# Patient Record
Sex: FEMALE | Race: WHITE | Employment: UNEMPLOYED | URBAN - METROPOLITAN AREA
[De-identification: names, ages, dates, MRNs, and addresses within clinical notes are randomized per-mention and may not be internally consistent; named-entity substitution may affect disease eponyms.]

---

## 2017-08-15 ENCOUNTER — OFFICE VISIT (OUTPATIENT)
Dept: FAMILY MEDICINE CLINIC | Facility: CLINIC | Age: 42
End: 2017-08-15

## 2017-08-15 VITALS
SYSTOLIC BLOOD PRESSURE: 138 MMHG | RESPIRATION RATE: 16 BRPM | DIASTOLIC BLOOD PRESSURE: 80 MMHG | OXYGEN SATURATION: 95 % | HEART RATE: 107 BPM | TEMPERATURE: 99 F

## 2017-08-15 DIAGNOSIS — J40 BRONCHITIS: Primary | ICD-10-CM

## 2017-08-15 PROCEDURE — 99202 OFFICE O/P NEW SF 15 MIN: CPT | Performed by: PHYSICIAN ASSISTANT

## 2017-08-15 PROCEDURE — 94640 AIRWAY INHALATION TREATMENT: CPT | Performed by: PHYSICIAN ASSISTANT

## 2017-08-15 RX ORDER — ALBUTEROL SULFATE 90 UG/1
2 AEROSOL, METERED RESPIRATORY (INHALATION) EVERY 4 HOURS PRN
Qty: 1 INHALER | Refills: 0 | Status: SHIPPED | OUTPATIENT
Start: 2017-08-15 | End: 2018-01-23 | Stop reason: ALTCHOICE

## 2017-08-15 RX ORDER — AZITHROMYCIN 250 MG/1
TABLET, FILM COATED ORAL
Qty: 6 TABLET | Refills: 0 | Status: SHIPPED | OUTPATIENT
Start: 2017-08-15 | End: 2017-09-05 | Stop reason: ALTCHOICE

## 2017-08-15 RX ORDER — PREDNISONE 20 MG/1
TABLET ORAL
Qty: 10 TABLET | Refills: 0 | Status: SHIPPED | OUTPATIENT
Start: 2017-08-15 | End: 2017-09-05 | Stop reason: ALTCHOICE

## 2017-08-15 RX ORDER — ALBUTEROL SULFATE 2.5 MG/3ML
2.5 SOLUTION RESPIRATORY (INHALATION) ONCE
Status: COMPLETED | OUTPATIENT
Start: 2017-08-15 | End: 2017-08-15

## 2017-08-15 RX ADMIN — ALBUTEROL SULFATE 2.5 MG: 2.5 SOLUTION RESPIRATORY (INHALATION) at 18:35:00

## 2017-08-15 NOTE — PATIENT INSTRUCTIONS
What is Acute Bronchitis? Acute or short-term bronchitis last for days or weeks. It occurs when the bronchial tubes (airways in the lungs) are irritated by a virus, bacteria, or allergen. This causes a cough that produces yellow or greenish mucus.   In secondhand smoke. · Use a humidifier, or breathe in steam from a hot shower. This may help loosen mucus. · Drink a lot of water and juice. They can soothe the throat and may help thin mucus.   · Sit up or use extra pillows when in bed to help lessen cough tubes (airways in the lungs) are irritated by a virus, bacteria, or allergen. This causes a cough that produces yellow or greenish mucus. Inside healthy lungs  Air travels in and out of the lungs through the airways.  The linings of these airways produce s water and juice. They can soothe the throat and may help thin mucus. · Sit up or use extra pillows when in bed to help lessen coughing and congestion. · Ask your provider about using cough medicine, pain and fever medication, or a decongestant.   Antibiot

## 2017-08-15 NOTE — PROGRESS NOTES
CHIEF COMPLAINT:   Patient presents with:  URI        HPI:   Sofia Teresa is a 38year old female who presents for URI symptoms with cough, nasal congestion, sore throat for  4  days.   Feels like symptoms moved more into her chest today and doing so EXTREMITIES:  No clubbing, cyanosis, or edema.     ASSESSMENT AND PLAN:   Netta Braden is a 38year old female who presents with:     ASSESSMENT:  Bronchitis  (primary encounter diagnosis)    PLAN:  Given a albuterol neb in office which small subje

## 2017-09-05 ENCOUNTER — OFFICE VISIT (OUTPATIENT)
Dept: FAMILY MEDICINE CLINIC | Facility: CLINIC | Age: 42
End: 2017-09-05

## 2017-09-05 VITALS
HEART RATE: 100 BPM | TEMPERATURE: 98 F | SYSTOLIC BLOOD PRESSURE: 138 MMHG | OXYGEN SATURATION: 97 % | RESPIRATION RATE: 16 BRPM | DIASTOLIC BLOOD PRESSURE: 80 MMHG

## 2017-09-05 DIAGNOSIS — Z23 NEED FOR PROPHYLACTIC VACCINATION AND INOCULATION AGAINST INFLUENZA: ICD-10-CM

## 2017-09-05 DIAGNOSIS — H92.02 OTALGIA OF LEFT EAR: ICD-10-CM

## 2017-09-05 DIAGNOSIS — J06.9 URI, ACUTE: Primary | ICD-10-CM

## 2017-09-05 PROCEDURE — 99213 OFFICE O/P EST LOW 20 MIN: CPT | Performed by: PHYSICIAN ASSISTANT

## 2017-09-05 PROCEDURE — 90471 IMMUNIZATION ADMIN: CPT | Performed by: PHYSICIAN ASSISTANT

## 2017-09-05 PROCEDURE — 90686 IIV4 VACC NO PRSV 0.5 ML IM: CPT | Performed by: PHYSICIAN ASSISTANT

## 2017-09-05 RX ORDER — FLUTICASONE PROPIONATE 50 MCG
2 SPRAY, SUSPENSION (ML) NASAL DAILY
Qty: 1 BOTTLE | Refills: 0 | Status: SHIPPED | OUTPATIENT
Start: 2017-09-05 | End: 2018-01-23 | Stop reason: ALTCHOICE

## 2017-09-05 RX ORDER — BENZONATATE 200 MG/1
200 CAPSULE ORAL 3 TIMES DAILY PRN
Qty: 3 CAPSULE | Refills: 0 | Status: SHIPPED | OUTPATIENT
Start: 2017-09-05 | End: 2018-01-23 | Stop reason: ALTCHOICE

## 2017-09-05 RX ORDER — AMOXICILLIN AND CLAVULANATE POTASSIUM 875; 125 MG/1; MG/1
1 TABLET, FILM COATED ORAL 2 TIMES DAILY
Qty: 20 TABLET | Refills: 0 | Status: SHIPPED | OUTPATIENT
Start: 2017-09-05 | End: 2017-09-15

## 2017-09-05 NOTE — PROGRESS NOTES
CHIEF COMPLAINT:   Patient presents with:  URI: 5 days. cough, congestion, left ear fullness, right eye drainage. Imm/Inj: request flu shot      HPI:      Ashley Teague is a 38year old female who presents for upper respiratory symptoms for 5 days. GENERAL: WNWD NAD she looks well. SKIN: no rashes,no suspicious lesions  HEAD: atraumatic, normocephalic. No gross tenderness on palpation of  sinuses  EYES: sclera non icteric, conjunctiva clear  EARS: TM's clear with intact landmarks on the right.   Bowen Anderson indicates understanding of these issues and agrees to the plan. The patient is asked to follow up with pcp if sx's persist or worsen.

## 2017-09-05 NOTE — PATIENT INSTRUCTIONS
Viral Respiratory Illness [Adult]  You have an Upper Respiratory Illness (URI) caused by a virus. This illness is contagious during the first few days.  It is spread through the air by coughing and sneezing or by direct contact (touching the sick person a © 0916-1942 [address], [address] [place]. All rights reserved. This information is not intended as a substitute for professional medical care. Always follow your healthcare professional's instructions.

## 2017-09-06 ENCOUNTER — TELEPHONE (OUTPATIENT)
Dept: FAMILY MEDICINE CLINIC | Facility: CLINIC | Age: 42
End: 2017-09-06

## 2017-09-06 RX ORDER — BENZONATATE 200 MG/1
200 CAPSULE ORAL 3 TIMES DAILY PRN
Qty: 30 CAPSULE | Refills: 0 | Status: SHIPPED | OUTPATIENT
Start: 2017-09-06 | End: 2018-01-23 | Stop reason: ALTCHOICE

## 2018-01-23 ENCOUNTER — HOSPITAL ENCOUNTER (OUTPATIENT)
Age: 43
Discharge: HOME | End: 2018-01-23
Attending: FAMILY MEDICINE
Payer: COMMERCIAL

## 2018-01-23 VITALS
OXYGEN SATURATION: 99 % | WEIGHT: 215 LBS | BODY MASS INDEX: 33.74 KG/M2 | RESPIRATION RATE: 16 BRPM | HEIGHT: 67 IN | DIASTOLIC BLOOD PRESSURE: 72 MMHG | SYSTOLIC BLOOD PRESSURE: 143 MMHG | TEMPERATURE: 97 F | HEART RATE: 89 BPM

## 2018-01-23 DIAGNOSIS — J11.1 INFLUENZA: Primary | ICD-10-CM

## 2018-01-23 PROCEDURE — 99213 OFFICE O/P EST LOW 20 MIN: CPT

## 2018-01-23 PROCEDURE — 99204 OFFICE O/P NEW MOD 45 MIN: CPT

## 2018-01-23 RX ORDER — OSELTAMIVIR PHOSPHATE 75 MG/1
75 CAPSULE ORAL 2 TIMES DAILY
Qty: 10 CAPSULE | Refills: 0 | Status: SHIPPED | OUTPATIENT
Start: 2018-01-23 | End: 2018-01-28

## 2018-01-23 NOTE — ED INITIAL ASSESSMENT (HPI)
Pt c/o headache and cough that started Sunday. Yesterday patient statrted having increased achiness and pains. Also had stuffiness and coughing .   Pt had been routinely taking advil and tylenol  Since Sunday - Did have a temp of 100.8 - even with taking

## 2018-01-23 NOTE — ED PROVIDER NOTES
Patient Seen in: [facility]    History   Patient presents with:  Flu    Stated Complaint: flu like symptoms x2 days    HPI    47-year-old female presents for flulike symptoms.   Patient states she started with headache and n Conjunctivae and EOM are normal. Pupils are equal, round, and reactive to light. Neck: Normal range of motion. Neck supple. Cardiovascular: Normal rate, regular rhythm, normal heart sounds and intact distal pulses.     Pulmonary/Chest: Effort normal and

## 2018-11-05 ENCOUNTER — IMMUNIZATION (OUTPATIENT)
Dept: FAMILY MEDICINE CLINIC | Facility: CLINIC | Age: 43
End: 2018-11-05

## 2018-11-05 DIAGNOSIS — Z23 NEED FOR VACCINATION: ICD-10-CM

## 2018-11-05 PROCEDURE — 90686 IIV4 VACC NO PRSV 0.5 ML IM: CPT | Performed by: NURSE PRACTITIONER

## 2018-11-05 PROCEDURE — 90471 IMMUNIZATION ADMIN: CPT | Performed by: NURSE PRACTITIONER

## 2019-04-14 ENCOUNTER — OFFICE VISIT (OUTPATIENT)
Dept: FAMILY MEDICINE CLINIC | Facility: CLINIC | Age: 44
End: 2019-04-14

## 2019-04-14 VITALS
DIASTOLIC BLOOD PRESSURE: 74 MMHG | HEART RATE: 86 BPM | OXYGEN SATURATION: 99 % | RESPIRATION RATE: 12 BRPM | SYSTOLIC BLOOD PRESSURE: 135 MMHG | TEMPERATURE: 99 F

## 2019-04-14 DIAGNOSIS — R09.89 ABNORMAL LUNG SOUNDS: ICD-10-CM

## 2019-04-14 DIAGNOSIS — J40 BRONCHITIS: Primary | ICD-10-CM

## 2019-04-14 PROCEDURE — 99213 OFFICE O/P EST LOW 20 MIN: CPT | Performed by: PHYSICIAN ASSISTANT

## 2019-04-14 RX ORDER — PREDNISONE 20 MG/1
20 TABLET ORAL 2 TIMES DAILY
Qty: 10 TABLET | Refills: 0 | Status: SHIPPED | OUTPATIENT
Start: 2019-04-14 | End: 2019-04-19

## 2019-04-14 RX ORDER — AZITHROMYCIN 250 MG/1
TABLET, FILM COATED ORAL
Qty: 6 TABLET | Refills: 0 | Status: SHIPPED | OUTPATIENT
Start: 2019-04-14 | End: 2021-04-22

## 2019-04-14 RX ORDER — BENZONATATE 200 MG/1
200 CAPSULE ORAL 3 TIMES DAILY PRN
Qty: 30 CAPSULE | Refills: 0 | Status: SHIPPED | OUTPATIENT
Start: 2019-04-14 | End: 2019-04-24

## 2019-04-14 NOTE — PATIENT INSTRUCTIONS
Rest  Fluids   Prednisone with food.  No ibuprofen or aleve   Cough medication as needed   Please follow up with PCP if no improvement or if symptoms worsen

## 2019-04-14 NOTE — PROGRESS NOTES
CHIEF COMPLAINT:   Patient presents with:  Cough        HPI:   Orlando Quach is a 53year old female who presents for cough for 4 days. Dry cough. No chest pain or SOB. No fever/aches/chills. No head congestion, headaches, sore throat.  No OTC medicat lymphadenopathy. ASSESSMENT AND PLAN:     Chichi Santizo is a 53year old female who presents with:     ASSESSMENT:  Bronchitis  (primary encounter diagnosis)  Abnormal lung sounds    PLAN:  Meds as below.   Comfort Care as listed in Patient Inst

## 2019-09-30 ENCOUNTER — HOSPITAL ENCOUNTER (OUTPATIENT)
Age: 44
Discharge: HOME | End: 2019-09-30
Attending: FAMILY MEDICINE
Payer: COMMERCIAL

## 2019-09-30 VITALS
RESPIRATION RATE: 20 BRPM | TEMPERATURE: 98 F | SYSTOLIC BLOOD PRESSURE: 135 MMHG | DIASTOLIC BLOOD PRESSURE: 72 MMHG | HEART RATE: 88 BPM | OXYGEN SATURATION: 100 %

## 2019-09-30 DIAGNOSIS — L02.416 ABSCESS OF LEFT THIGH: Primary | ICD-10-CM

## 2019-09-30 DIAGNOSIS — B86 SCABIES: ICD-10-CM

## 2019-09-30 PROCEDURE — 99213 OFFICE O/P EST LOW 20 MIN: CPT

## 2019-09-30 PROCEDURE — 99214 OFFICE O/P EST MOD 30 MIN: CPT

## 2019-09-30 RX ORDER — HALOBETASOL PROPIONATE 0.5 MG/G
AEROSOL, FOAM TOPICAL
COMMUNITY
End: 2021-04-22

## 2019-09-30 RX ORDER — SULFAMETHOXAZOLE AND TRIMETHOPRIM 800; 160 MG/1; MG/1
1 TABLET ORAL 2 TIMES DAILY
Qty: 14 TABLET | Refills: 0 | Status: SHIPPED | OUTPATIENT
Start: 2019-09-30 | End: 2019-10-07

## 2019-09-30 RX ORDER — PERMETHRIN 50 MG/G
CREAM TOPICAL
Qty: 1 BOTTLE | Refills: 0 | Status: SHIPPED | OUTPATIENT
Start: 2019-09-30 | End: 2021-04-22

## 2019-09-30 NOTE — ED INITIAL ASSESSMENT (HPI)
Abscess to left upper thigh for one and half weeks, this abscess broke  Yesterday but still has a, \"Lump. \" no fevers, pt also has a rash on both her feet and both ankles.

## 2019-10-01 NOTE — ED PROVIDER NOTES
Patient Seen in: [facility]      History   Patient presents with:  Abscess (integumentary)    Stated Complaint: absess in lady area    HPI    53year old female presents for abscess on the left upper inner thigh.  States she noticed s Skin is warm and dry. Small 1 x 1 cm pustule with induration on the left upper inner thigh noted. No drainage noted. Several erythematous papules noted on bilateral plantar surface and dorsal foot, bilateral ankle and forearm.     Psychiatric: She has

## 2021-04-22 ENCOUNTER — HOSPITAL ENCOUNTER (INPATIENT)
Facility: HOSPITAL | Age: 46
LOS: 3 days | Discharge: HOME | DRG: 177 | End: 2021-04-25
Attending: EMERGENCY MEDICINE | Admitting: HOSPITALIST
Payer: COMMERCIAL

## 2021-04-22 ENCOUNTER — APPOINTMENT (OUTPATIENT)
Dept: GENERAL RADIOLOGY | Facility: HOSPITAL | Age: 46
DRG: 177 | End: 2021-04-22
Attending: EMERGENCY MEDICINE
Payer: COMMERCIAL

## 2021-04-22 DIAGNOSIS — U07.1 PNEUMONIA DUE TO COVID-19 VIRUS: Primary | ICD-10-CM

## 2021-04-22 DIAGNOSIS — D72.819 LEUKOPENIA, UNSPECIFIED TYPE: ICD-10-CM

## 2021-04-22 DIAGNOSIS — J12.82 PNEUMONIA DUE TO COVID-19 VIRUS: Primary | ICD-10-CM

## 2021-04-22 DIAGNOSIS — E87.6 HYPOKALEMIA: ICD-10-CM

## 2021-04-22 DIAGNOSIS — D64.9 ANEMIA, UNSPECIFIED TYPE: ICD-10-CM

## 2021-04-22 DIAGNOSIS — D50.9 MICROCYTIC ANEMIA: ICD-10-CM

## 2021-04-22 PROCEDURE — 30233N1 TRANSFUSION OF NONAUTOLOGOUS RED BLOOD CELLS INTO PERIPHERAL VEIN, PERCUTANEOUS APPROACH: ICD-10-PCS | Performed by: INTERNAL MEDICINE

## 2021-04-22 PROCEDURE — 99223 1ST HOSP IP/OBS HIGH 75: CPT | Performed by: HOSPITALIST

## 2021-04-22 PROCEDURE — 71045 X-RAY EXAM CHEST 1 VIEW: CPT | Performed by: EMERGENCY MEDICINE

## 2021-04-22 RX ORDER — HYDRALAZINE HYDROCHLORIDE 20 MG/ML
10 INJECTION INTRAMUSCULAR; INTRAVENOUS EVERY 6 HOURS PRN
Status: DISCONTINUED | OUTPATIENT
Start: 2021-04-22 | End: 2021-04-25

## 2021-04-22 RX ORDER — POTASSIUM CHLORIDE 20 MEQ/1
40 TABLET, EXTENDED RELEASE ORAL ONCE
Status: COMPLETED | OUTPATIENT
Start: 2021-04-22 | End: 2021-04-22

## 2021-04-22 RX ORDER — SODIUM CHLORIDE 9 MG/ML
INJECTION, SOLUTION INTRAVENOUS ONCE
Status: COMPLETED | OUTPATIENT
Start: 2021-04-22 | End: 2021-04-22

## 2021-04-22 RX ORDER — ASCORBIC ACID 500 MG
1000 TABLET ORAL DAILY
Status: DISCONTINUED | OUTPATIENT
Start: 2021-04-22 | End: 2021-04-23

## 2021-04-22 RX ORDER — SODIUM CHLORIDE 9 MG/ML
INJECTION, SOLUTION INTRAVENOUS CONTINUOUS
Status: CANCELLED | OUTPATIENT
Start: 2021-04-22 | End: 2021-04-22

## 2021-04-22 RX ORDER — BENZONATATE 200 MG/1
200 CAPSULE ORAL 3 TIMES DAILY PRN
Status: DISCONTINUED | OUTPATIENT
Start: 2021-04-22 | End: 2021-04-25

## 2021-04-22 RX ORDER — ACETAMINOPHEN 160 MG
2000 TABLET,DISINTEGRATING ORAL DAILY
Status: DISCONTINUED | OUTPATIENT
Start: 2021-04-22 | End: 2021-04-23

## 2021-04-22 RX ORDER — ONDANSETRON 2 MG/ML
4 INJECTION INTRAMUSCULAR; INTRAVENOUS EVERY 6 HOURS PRN
Status: DISCONTINUED | OUTPATIENT
Start: 2021-04-22 | End: 2021-04-25

## 2021-04-22 RX ORDER — ACETAMINOPHEN 325 MG/1
650 TABLET ORAL EVERY 6 HOURS PRN
Status: DISCONTINUED | OUTPATIENT
Start: 2021-04-22 | End: 2021-04-23

## 2021-04-22 RX ORDER — ZINC SULFATE 50(220)MG
220 CAPSULE ORAL 2 TIMES DAILY
Status: DISCONTINUED | OUTPATIENT
Start: 2021-04-22 | End: 2021-04-23

## 2021-04-22 RX ORDER — GUAIFENESIN 600 MG
600 TABLET, EXTENDED RELEASE 12 HR ORAL 2 TIMES DAILY
Status: DISCONTINUED | OUTPATIENT
Start: 2021-04-22 | End: 2021-04-23

## 2021-04-22 RX ORDER — ALBUTEROL SULFATE 90 UG/1
4 AEROSOL, METERED RESPIRATORY (INHALATION) EVERY 4 HOURS PRN
Status: DISCONTINUED | OUTPATIENT
Start: 2021-04-22 | End: 2021-04-25

## 2021-04-22 NOTE — H&P
[facility] HOSPITAL[facility]  History and Physical     Emily Quick Patient Status:  Emergency    1975 MRN [id number]   Location [facility] Attending Sona Montgomery, [facility] [place] Day # 0 PCP Tiarra Layton MD     Chief Complaint: SOB organomegaly. Neurologic: No focal neurological deficits. Musculoskeletal: Moves all extremities. Extremities: No edema or cyanosis. Integument: No rashes or lesions. Psychiatric: Appropriate mood and affect.       Diagnostic Data:      Labs:  Recent

## 2021-04-22 NOTE — ED PROVIDER NOTES
Patient Seen in: [facility] Emergency Department      History   Patient presents with:  Covid    Stated Complaint: Covid + 4/17    HPI/Subjective:   HPI    47-year-old female presents emergency room with chief complaint of cough and shortness of veronica no rhonchi, no wheezing, no stridor.   ABDOMEN: Soft, nondistended,non tender  EXTREMITIES: No peripheral edema, no calf tenderness  RECTAL EXAM–performed with ER tech Bradford Seals as chaperone–guaiac negative brown stool    ED Course     Labs Reviewed   COMP META tests on the individual orders. CBC WITH DIFFERENTIAL WITH PLATELET    Narrative: The following orders were created for panel order CBC WITH DIFFERENTIAL WITH PLATELET.   Procedure                               Abnormality         Status

## 2021-04-23 PROCEDURE — 99255 IP/OBS CONSLTJ NEW/EST HI 80: CPT | Performed by: INTERNAL MEDICINE

## 2021-04-23 PROCEDURE — 99232 SBSQ HOSP IP/OBS MODERATE 35: CPT | Performed by: INTERNAL MEDICINE

## 2021-04-23 PROCEDURE — XW033E5 INTRODUCTION OF REMDESIVIR ANTI-INFECTIVE INTO PERIPHERAL VEIN, PERCUTANEOUS APPROACH, NEW TECHNOLOGY GROUP 5: ICD-10-PCS | Performed by: INTERNAL MEDICINE

## 2021-04-23 RX ORDER — FERROUS SULFATE 325(65) MG
325 TABLET ORAL
Qty: 90 TABLET | Refills: 0 | Status: SHIPPED | OUTPATIENT
Start: 2021-04-23

## 2021-04-23 RX ORDER — MELATONIN
1000 DAILY
Status: DISCONTINUED | OUTPATIENT
Start: 2021-04-23 | End: 2021-04-25

## 2021-04-23 RX ORDER — CYANOCOBALAMIN 1000 UG/ML
1000 INJECTION INTRAMUSCULAR; SUBCUTANEOUS WEEKLY
Status: DISCONTINUED | OUTPATIENT
Start: 2021-04-23 | End: 2021-04-25

## 2021-04-23 RX ORDER — CODEINE PHOSPHATE AND GUAIFENESIN 10; 100 MG/5ML; MG/5ML
5 SOLUTION ORAL EVERY 4 HOURS PRN
Status: DISCONTINUED | OUTPATIENT
Start: 2021-04-23 | End: 2021-04-25

## 2021-04-23 RX ORDER — ACETAMINOPHEN 500 MG
TABLET ORAL EVERY 6 HOURS PRN
Status: DISCONTINUED | OUTPATIENT
Start: 2021-04-23 | End: 2021-04-25

## 2021-04-23 RX ORDER — ASCORBIC ACID 500 MG
500 TABLET ORAL DAILY
Qty: 30 TABLET | Refills: 0 | Status: SHIPPED | OUTPATIENT
Start: 2021-04-23

## 2021-04-23 RX ORDER — SODIUM CHLORIDE 9 MG/ML
INJECTION, SOLUTION INTRAVENOUS ONCE
Status: COMPLETED | OUTPATIENT
Start: 2021-04-23 | End: 2021-04-23

## 2021-04-23 NOTE — PROGRESS NOTES
Hgb 5.8 after 1 unit PRBC transfused. MD Ojeda notified- see new orders, currently transfusing 2nd bag of PRBC.

## 2021-04-23 NOTE — CONSULTS
INFECTIOUS DISEASE CONSULT NOTE    Emily Israel Patient Status:  Inpatient    1975 MRN [id number]   [facility] 5NW-A Attending Sarah Israel MD   Hosp Day # 1 PCP Shen Bowen MD       Reason for Consultation:    Covid 19 infec positives and negatives in the the HPI. Constitutional: as above  Eyes: Negative for eye drainage and redness.   Ears, nose, mouth, throat: Negative for ear pain, oral ulcers  Respiratory: as above  Cardiovascular: Negative for syncope, lower extremity e Negative   WBCUR 11-20*   RBCUR 0-2   BACUR 2+*   EPIUR Many*        COVID-19 Lab Results    COVID-19  Lab Results   Component Value Date    COVID19 Detected (A) 04/17/2021       Pro-Calcitonin  Recent Labs   Lab 04/22/21  1643   PCT 0.05       Cardiac  Re some studies have shown a tendency to more severe disease in pts with anemia  - follow Hb    3. Leukopenia and thrombocytopenia- these can be seen with covid. Monitor. Heme following    4. Hyperbilirubinemia- mild. Recheck in am. Fractionate bili.  LDH is n

## 2021-04-23 NOTE — CONSULTS
[facility] [place] [facility] Hematology and Oncology Consult Note    Reason for Consult: Anemia  Medical Record Number: [id number]   CSN: [id number]   Referring Physician: No ref.  provider found  PCP: Seema Catalan MD    History of Present Illness: 47F with no significant PMH pres reviewed. No pertinent past medical history. History reviewed. No pertinent surgical history.   Social History    Socioeconomic History      Marital status:       Spouse name: Not on file      Number of children: Not on file      Years of education: x 5  · Parvovirus B19  · Normal LDH  · Daily CBC and Retic     Vitamin B12 Deficiency   · 190 on admission. MMA and anti-IF ab  · B12 weekly x 4 then monthly. · Daily PO B12    Lymphocytopenia and mild thrombocytopenia   · Likely 2/2 COVID.  Possibly rela

## 2021-04-23 NOTE — PROGRESS NOTES
NURSING ADMISSION NOTE      Patient admitted via Cart  Oriented to room 508  Safety precautions initiated. Bed in low position. Call light in reach. Admission navigator completed. ID consulted, no new orders.     Heme consulted- awaiting callback

## 2021-04-23 NOTE — PROGRESS NOTES
[facility]  Progress Note    [address] Patient Status:  Inpatient    1975 MRN [id number]   [facility] 5NW-A Attending Thanh Duenas MD   Hosp Day # 1 PCP Thalia Johnson MD     CC: Cough, shortness of breath    SUBJECTIVE: CO2 25.0 04/23/2021     04/23/2021    CA 7.7 04/23/2021    ALB 3.5 04/23/2021    ALKPHO 84 04/23/2021    BILT 2.2 04/23/2021    TP 7.0 04/23/2021    AST 27 04/23/2021    ALT 25 04/23/2021    DDIMER 0.42 04/23/2021    CRP 1.98 04/23/2021    TROP < mg, Intravenous, Q24H  sodium chloride 0.9% IV bolus 500 mL, 500 mL, Intravenous, PRN  Albuterol Sulfate  (90 Base) MCG/ACT inhaler 4 puff, 4 puff, Inhalation, Q4H PRN  benzonatate (TESSALON) cap 200 mg, 200 mg, Oral, TID PRN  acetaminophen (TYLENOL

## 2021-04-23 NOTE — PROGRESS NOTES
COVID-19 Daily Discharge Readiness-Nursing    O2 Sat at Rest:   97 % on RA  Temperature max from last 24 hrs: Temp (24hrs), Av.3 °F (37.4 °C), Min:97.5 °F (36.4 °C), Max:100.1 °F (37.8 °C)    Inflammatory Markers:   Recent Labs   Lab 21  1643

## 2021-04-23 NOTE — CONSULTS
[facility]                       Gastroenterology [address] [facility] Gastroenterology    Emily Wilks Patient Status:  Inpatient    1975 MRN [id number]   [facility] 5NW-A Attending Sadi Wilks MD   [facility] injection 4 mg, 4 mg, Intravenous, Q6H PRN  [COMPLETED] 0.9% NaCl infusion, , Intravenous, Once  hydrALAzine HCl (APRESOLINE) injection 10 mg, 10 mg, Intravenous, Q6H PRN        Allergies: No Known Allergies    SocHx:        Smoking status: Former oropharynx is clear with moist mucosal membranes    Eyes: Sclerae are anicteric    Neck:  Supple without nuchal rigidity    CV: Regular rate and rhythm, with normal S1 and S2    Resp: Diminished; frequent dry cough     Abdomen: Obese, soft, non-tender, non  Mediastinal contours are   smooth.       Impression   CONCLUSION:  Ill-defined opacities are seen in both lungs, left greater than right.  The pattern is consistent with COVID-19 pneumonia.  Continued clinical correlation recommended.       Dictated by (C

## 2021-04-24 PROCEDURE — 99231 SBSQ HOSP IP/OBS SF/LOW 25: CPT | Performed by: INTERNAL MEDICINE

## 2021-04-24 PROCEDURE — 99232 SBSQ HOSP IP/OBS MODERATE 35: CPT | Performed by: INTERNAL MEDICINE

## 2021-04-24 NOTE — PROGRESS NOTES
[facility]    Progress Note    [address] Patient Status:  Inpatient    1975 MRN [id number]   [facility] 5NW-A Attending Geraldo Reza MD   Hosp Day # 2 PCP Gregory Frias MD   This patient is COVID-19 positive.  For purpo deficiency: Continue oral B12. Neutropenia: Secondary to covid and B12 deficiency. Improving. Thrombocytopenia: Likely secondary to COVID and B12 deficeincy. Stable.     COVID pneumonia: Per primary team.        Vivien Foster  4/24/2021  6:56 A

## 2021-04-24 NOTE — PROGRESS NOTES
[facility]  Progress Note    [address] Patient Status:  Inpatient    1975 MRN [id number]   [facility] 5NW-A Attending Stanley Fleischer, MD   Hosp Day # 2 PCP Roxi Valente MD     CC: Cough, shortness of breath    SUBJECTIVE: 83 04/24/2021    BILT 1.3 04/24/2021    TP 7.2 04/24/2021    AST 20 04/24/2021    ALT 22 04/24/2021    DDIMER 0.32 04/24/2021    CRP 2.86 04/24/2021     COVID-19 Lab Results    COVID-19  Lab Results   Component Value Date    COVID19 Detected (A) 04/17/2021 (ROBITUSSIN AC) 100-10 MG/5ML syrup 5 mL, 5 mL, Oral, Q4H PRN  acetaminophen (TYLENOL EXTRA STRENGTH) tab 500-1,000 mg, 500-1,000 mg, Oral, Q6H PRN  sodium chloride 0.9% IV bolus 500 mL, 500 mL, Intravenous, PRN  Albuterol Sulfate  (90 Base) MCG/ACT with patient and RN        Sarwat Elizabeth MD  4/24/2021

## 2021-04-24 NOTE — COVID NURSING ASSESSMENT
COVID-19 Daily Discharge Readiness-Nursing    O2 Sat at Rest:  96  % on room air  O2 Sat with Exertion:    % on    liters   Temperature max from last 24 hrs: Temp (24hrs), Av.7 °F (37.6 °C), Min:99 °F (37.2 °C), Max:100 °F (37.8 °C)    Inflammatory Mar

## 2021-04-24 NOTE — PROGRESS NOTES
INFECTIOUS DISEASE PROGRESS NOTE    Emily Beckman Patient Status:  Inpatient    1975 MRN [id number]   [facility] 5NW-A Attending Susan Beckman MD   Hosp Day # 2 PCP MD Damir Mcnally  CCP    Subjective: Pt not 04/22/21  1707 04/22/21  1707 04/23/21  0046 04/23/21  0553 04/24/21  0608   RBC 4.09  --   --  4.46 4.52   HGB 6.0*   < > 5.8* 7.1* 7.5*   HCT 24.1*  --   --  28.8* 29.1*   MCV 58.9*  --   --  64.6* 64.4*   MCH 14.7*  --   --  15.9* 16.6*   MCHC 24.9*  -- obtained. COMPARISON:  None. INDICATIONS:  Covid + 4/17  PATIENT STATED HISTORY: (As transcribed by Technologist)  Shortness of breath with history of COVID. FINDINGS:  Lung volumes are satisfactory.   There are ill-defined patchy opacities at the left

## 2021-04-24 NOTE — PLAN OF CARE
COVID-19 Daily Discharge Readiness-Nursing    O2 Sat at Rest: 94 % on room air  O2 Sat with Exertion:    % on    liters   Temperature max from last 24 hrs: Temp (24hrs), Av.4 °F (37.4 °C), Min:98.7 °F (37.1 °C), Max:99.9 °F (37.7 °C)    Inflammatory Ma [x] Primary Care Physician     [] Other Specialty    Watched the home discharge recovery videos related to diagnosis. .. [x] Pneumonia     [] COPD    [] Home Health set up. [x] Care partner identified and updated with the plan of care.     Problem: Pa retention  Outcome: Progressing     Problem: HEMATOLOGIC - ADULT  Goal: Maintains hematologic stability  Description: INTERVENTIONS  - Assess for signs and symptoms of bleeding or hemorrhage  - Monitor labs and vital signs for trends  - Administer supporti Monitor labs and vital signs for trends  - Administer supportive blood products/factors, fluids and medications as ordered and appropriate  - Administer supportive blood products/factors as ordered and appropriate  Outcome: Progressing

## 2021-04-24 NOTE — PROGRESS NOTES
[facility] Note: Antimicrobial Weight Based Dose Adjustment for: cefoxitin (Katia Kuhn)    Rena Contreras is a 48year old patient who has been prescribed ceftriaxone (ROCEPHIN) 1000 mg every 24 hours.     Estimated Creatinine Clearance: 115.4 mL/min (b

## 2021-04-25 VITALS
SYSTOLIC BLOOD PRESSURE: 125 MMHG | DIASTOLIC BLOOD PRESSURE: 65 MMHG | WEIGHT: 242 LBS | BODY MASS INDEX: 37.98 KG/M2 | OXYGEN SATURATION: 94 % | HEIGHT: 67 IN | HEART RATE: 73 BPM | RESPIRATION RATE: 22 BRPM | TEMPERATURE: 99 F

## 2021-04-25 PROCEDURE — 99239 HOSP IP/OBS DSCHRG MGMT >30: CPT | Performed by: INTERNAL MEDICINE

## 2021-04-25 PROCEDURE — 99232 SBSQ HOSP IP/OBS MODERATE 35: CPT | Performed by: INTERNAL MEDICINE

## 2021-04-25 NOTE — PLAN OF CARE
COVID-19 Daily Discharge Readiness-Nursing    O2 Sat at Rest: 95% on room air  O2 Sat with Exertion: 94 % on room air  Temperature max from last 24 hrs: Temp (24hrs), Av.4 °F (36.9 °C), Min:98 °F (36.7 °C), Max:98.8 °F (37.1 °C)    Inflammatory Markers discharge plan: Home     F/U appointment scheduled within 7 days. .. [x]? [facility]     []? Pulmonologist     [x]? Primary Care Physician     []? Other Specialty     Watched the home discharge recovery videos related to diagnosis. Guadalupe Riedel Guadalupe Riedel labs and vital signs for trends  - Administer supportive blood products/factors, fluids and medications as ordered and appropriate  - Administer supportive blood products/factors as ordered and appropriate  Outcome: Progressing  Goal: Free from bleeding in retention  Outcome: Progressing

## 2021-04-25 NOTE — DISCHARGE SUMMARY
[facility]  Discharge Summary    [address] Patient Status:  Inpatient    1975 MRN [id number]   [facility] 5NW-A Attending No att. providers found   Hosp Day # 3 PCP Deo Medina MD     Date of Admission: 2021    Date coli sensitive to all antibiotics, was treated with IV Rocephin in hospital.  D-dimer normal.  Was given subcutaneous Lovenox for DVT prophylaxis while in hospital.    Patient improved clinically and discharged home in stable condition today.   Follow-up wi [facility], [address]    Phone: [phone]   · ascorbic acid 500 MG Tabs  · cyanocobalamin 1000 MCG Tabs  · Ferrous Sulfate 325 (65 Fe) MG Tabs          [place] prescription monitoring program data reviewed in epic before pre

## 2021-04-25 NOTE — PROGRESS NOTES
[facility]  Progress Note    [address]  Patient Status:  Inpatient    1975 MRN [id number]   [facility] 5NW-A Attending Sebastien Sherwood MD   Hosp Day # 3 PCP Pamela Christensen MD     CC: Cough, shortness of breath    SUBJECTIVE: 04/25/2021    CO2 27.0 04/25/2021    GLU 87 04/25/2021    CA 8.1 04/25/2021    ALB 3.3 04/25/2021    ALKPHO 75 04/25/2021    BILT 0.8 04/25/2021    TP 6.8 04/25/2021    AST 16 04/25/2021    ALT 24 04/25/2021    CRP 3.49 04/25/2021     COVID-19 Lab Results Intramuscular, Weekly  Vitamin B-12 (VITAMIN B12) tab 1,000 mcg, 1,000 mcg, Oral, Daily  Pantoprazole Sodium (PROTONIX) 40 mg in Sodium Chloride (PF) 0.9 % 10 mL IV push, 40 mg, Intravenous, Q24H  remdesivir 100 mg in sodium chloride 0.9% 270 mL IVPB, 100 to TCC on discharge?:  Follow-up with regular outpatient primary care physician  Estimated date of discharge: Today  Discharge is dependent on: Clinical progress, cleared by ID  At this point Ms. Rizwana Strange  is expected to be discharge to: [facility]

## 2021-04-25 NOTE — PLAN OF CARE
NURSING DISCHARGE NOTE    Discharged Home via Wheelchair. Accompanied by Support staff  Belongings Taken by patient/family. Patient is stable to discharge home.  Medically cleared by all consults and hospitalist. Provided discharge instructions ab instruct to report SOB or any respiratory difficulty  - Respiratory Therapy support as indicated  - Manage/alleviate anxiety  - Monitor for signs/symptoms of CO2 retention  4/25/2021 1558 by Isabella Felty, RN  Outcome: Completed  4/25/2021 0954 b RN  Outcome: Completed  4/25/2021 0954 by Chang Matos RN  Outcome: Progressing     Problem: RESPIRATORY - ADULT  Goal: Achieves optimal ventilation and oxygenation  Description: INTERVENTIONS:  - Assess for changes in respiratory status  - Ass toothpicks and dental floss  - Use electric shaver for shaving  - Use soft bristle tooth brush  - Limit straining and forceful nose blowing  4/25/2021 1558 by Bridgett Ferrara RN  Outcome: Completed  4/25/2021 0954 by Bridgett Ferrara RN  O

## 2021-04-25 NOTE — PROGRESS NOTES
04/25/21 1100   Mobility   O2 walk?  Yes   SPO2% on Room Air at Rest 95   SPO2% Ambulation on Room Air 94

## 2021-04-25 NOTE — PLAN OF CARE
Problem: Patient/Family Goals  Goal: Patient/Family Long Term Goal  Description: Patient's Long Term Goal: To discharge with adequate resources  Interventions:  - Follow plan of care  - Collaborate with healthcare team  - See additional Care Plan goals f administration  - Ensure safe mobilization of patient  - Hold pressure on venipuncture sites to achieve adequate hemostasis  - Assess for signs and symptoms of internal bleeding  - Monitor lab trends  - Patient is to report abnormal signs of bleeding to st

## 2021-04-25 NOTE — PROGRESS NOTES
[facility]    Progress Note    [address] Patient Status:  Inpatient    1975 MRN [id number]   [facility] 5NW-A Attending Christopher Covington MD   Hosp Day # 3 PCP Robert Esquivel MD   This patient is COVID-19 positive.  For purpo oral B12. Neutropenia: Secondary to covid and B12 deficiency. Stable in a safe range. Thrombocytopenia: Likely secondary to COVID and B12 deficeincy. Stable in a safe range. COVID pneumonia: Per primary team.      Rory Fry M.D.   [facility]

## 2021-04-25 NOTE — COVID NURSING ASSESSMENT
COVID-19 Daily Discharge Readiness-Nursing    O2 Sat at Rest:   94  %     Temperature max from last 24 hrs: Temp (24hrs), Av °F (37.2 °C), Min:98 °F (36.7 °C), Max:99.9 °F (37.7 °C)    Inflammatory Markers:   Recent Labs   Lab 21  0552 21

## 2021-04-26 ENCOUNTER — TELEPHONE (OUTPATIENT)
Dept: HEMATOLOGY/ONCOLOGY | Facility: HOSPITAL | Age: 46
End: 2021-04-26

## 2021-04-26 ENCOUNTER — HOSPITAL ENCOUNTER (EMERGENCY)
Facility: HOSPITAL | Age: 46
Discharge: HOME | End: 2021-04-26
Attending: EMERGENCY MEDICINE
Payer: COMMERCIAL

## 2021-04-26 ENCOUNTER — PATIENT OUTREACH (OUTPATIENT)
Dept: CASE MANAGEMENT | Age: 46
End: 2021-04-26

## 2021-04-26 ENCOUNTER — APPOINTMENT (OUTPATIENT)
Dept: ULTRASOUND IMAGING | Age: 46
End: 2021-04-26
Attending: NURSE PRACTITIONER
Payer: COMMERCIAL

## 2021-04-26 ENCOUNTER — HOSPITAL ENCOUNTER (OUTPATIENT)
Age: 46
Discharge: ANOTHER HOSPITAL | End: 2021-04-26
Attending: EMERGENCY MEDICINE
Payer: COMMERCIAL

## 2021-04-26 VITALS
SYSTOLIC BLOOD PRESSURE: 135 MMHG | HEIGHT: 67 IN | WEIGHT: 230 LBS | BODY MASS INDEX: 36.1 KG/M2 | DIASTOLIC BLOOD PRESSURE: 68 MMHG | HEART RATE: 73 BPM | RESPIRATION RATE: 18 BRPM | OXYGEN SATURATION: 96 % | TEMPERATURE: 99 F

## 2021-04-26 VITALS
HEIGHT: 67 IN | OXYGEN SATURATION: 97 % | RESPIRATION RATE: 16 BRPM | HEART RATE: 79 BPM | DIASTOLIC BLOOD PRESSURE: 72 MMHG | TEMPERATURE: 98 F | BODY MASS INDEX: 36.1 KG/M2 | SYSTOLIC BLOOD PRESSURE: 134 MMHG | WEIGHT: 230 LBS

## 2021-04-26 DIAGNOSIS — I80.8 SUPERFICIAL THROMBOPHLEBITIS OF RIGHT UPPER EXTREMITY: ICD-10-CM

## 2021-04-26 DIAGNOSIS — T80.1XXA IV INFILTRATION, INITIAL ENCOUNTER: Primary | ICD-10-CM

## 2021-04-26 DIAGNOSIS — T80.1XXA INTRAVENOUS INFILTRATION, INITIAL ENCOUNTER: Primary | ICD-10-CM

## 2021-04-26 PROCEDURE — 93971 EXTREMITY STUDY: CPT | Performed by: NURSE PRACTITIONER

## 2021-04-26 PROCEDURE — 99284 EMERGENCY DEPT VISIT MOD MDM: CPT

## 2021-04-26 PROCEDURE — 99214 OFFICE O/P EST MOD 30 MIN: CPT

## 2021-04-26 PROCEDURE — 96374 THER/PROPH/DIAG INJ IV PUSH: CPT

## 2021-04-26 NOTE — ED INITIAL ASSESSMENT (HPI)
Pt was discharged from the hospital last night. Admitted for covid on 4/22. States she was getting an iron infusion yesterday, and states the nurse was having difficulty with the iv. Flushed the site, states site infiltrated.    Pt states she noticed

## 2021-04-26 NOTE — ED PROVIDER NOTES
Patient Seen in: [facility] Emergency Department      History   Patient presents with:  Cellulitis    Stated Complaint: Cellulitis in right arm from infiltrated iron infusion.  +COVID    HPI/Subjective:   HPI    70-year-old female presents from the i 134/72   Pulse 79   Temp 98 °F (36.7 °C)   Resp 16   Ht 170.2 cm (5' 7\")   Wt 104.3 kg   LMP 04/12/2021   SpO2 97%   BMI 36.02 kg/m²         Physical Exam    GENERAL: Patient is awake, alert, well-appearing, in no acute distress.   HEENT:  no scleral icter

## 2021-04-26 NOTE — PROGRESS NOTES
1st attempt to assist patient in sched hosp [id number] to  and pt is at [facility] right now due to arm swelling and they will conctact us back if she needs help sched appts.       Lennox Grow, [address] [facility] [phone]     In 1 w

## 2021-04-26 NOTE — ED PROVIDER NOTES
Patient Seen in: [facility]      History   Patient presents with:  Swelling    Stated Complaint: right arm swollen from iv    HPI/Subjective:    This is a 42-year-old female that was just discharged from the hospital yesterday for Covid pn (37.1 °C)   Temp src Tympanic   SpO2 97 %   O2 Device None (Room air)       Current:/68   Pulse 73   Temp 98.8 °F (37.1 °C) (Tympanic)   Resp 18   Ht 170.2 cm (5' 7\")   Wt 104.3 kg   LMP 04/12/2021   SpO2 96%   BMI 36.02 kg/m²         Physical Exam care provider for wound check in 3 days. She also may need follow-up with plastics. Ultrasound shows superficial thrombus in the cephalic vein. No evidence of DVT. Area of erythema was marked here in the clinic.     Patient was sent to the ED for infusi

## 2021-04-26 NOTE — ED PROVIDER NOTES
I reviewed that chart and discussed the case. I have examined the patient and noted swelling tenderness erythema to right antecubital area and distal arm. Patient has no drainage or bleeding noted. Patient slight pain with flexion. No palpable cords.

## 2021-04-26 NOTE — TELEPHONE ENCOUNTER
----- Message from Hazel Tejada MD sent at 4/26/2021  4:32 AM CDT -----  Hi    Please schedule post-hospital f/u in 4 weeks. Also needs an infusion visit for B12. Thanks!

## 2021-04-27 NOTE — PROGRESS NOTES
2nd attempt to contact patient to [facility] fup-MD office to call patient directly to ok in person or virtual visit      Aimee Ortega, [facility] [phone]     In 1 week      appt on Thurs 5/6 @10:30am

## 2021-04-30 ENCOUNTER — HOSPITAL ENCOUNTER (OUTPATIENT)
Age: 46
Discharge: HOME | End: 2021-04-30
Attending: NURSE PRACTITIONER
Payer: COMMERCIAL

## 2021-04-30 VITALS
WEIGHT: 230 LBS | DIASTOLIC BLOOD PRESSURE: 77 MMHG | BODY MASS INDEX: 36.1 KG/M2 | HEART RATE: 63 BPM | RESPIRATION RATE: 20 BRPM | TEMPERATURE: 98 F | HEIGHT: 67 IN | OXYGEN SATURATION: 99 % | SYSTOLIC BLOOD PRESSURE: 149 MMHG

## 2021-04-30 DIAGNOSIS — T80.1XXD: Primary | ICD-10-CM

## 2021-04-30 DIAGNOSIS — L03.113 CELLULITIS OF RIGHT UPPER EXTREMITY: ICD-10-CM

## 2021-04-30 PROCEDURE — 99213 OFFICE O/P EST LOW 20 MIN: CPT

## 2021-04-30 RX ORDER — CEPHALEXIN 500 MG/1
500 CAPSULE ORAL 4 TIMES DAILY
Qty: 28 CAPSULE | Refills: 0 | Status: SHIPPED | OUTPATIENT
Start: 2021-04-30 | End: 2021-05-11 | Stop reason: ALTCHOICE

## 2021-04-30 NOTE — ED PROVIDER NOTES
Patient Seen in: [facility] [place]      History   Patient presents with: Follow - Up  Arm Pain    Stated Complaint: seen here 4/26 for right arm superficial blood clot     HPI/Subjective:    This is a 20-year-old female below stated medical hist headaches. Hematological: Does not bruise/bleed easily. Positive for stated complaint: seen here 4/26 for right arm superficial blood clot   Other systems are as noted in HPI. Constitutional and vital signs reviewed.       All other systems reviewe also has superficial thrombophlebitis in that arm. Denies any chest pain or shortness of breath. States she is concerned because the area still hurts when she moves her arm touch. Otherwise CMS intact right upper extremity.     Area of erythema was [facility] Video

## 2021-04-30 NOTE — ED INITIAL ASSESSMENT (HPI)
Patient presents to IC for follow up of right arm thrombophlebitis from 4/26/21.(iv infiltrated in hospital(4/25/21)Painful with bending or straightening.+cms. Right hand dominent. Recent +covid

## 2021-05-08 NOTE — PROGRESS NOTES
[facility] Hematology and Oncology Clinic Note    Diagnosis:   1. Iron deficiency Anemia 2/2 NSAIDs and suspected GI blood loss  2. B12 deficiency     Treatment History:   1. IM B12 q month    2.  IV Iron  -IV Venofer 200 mg x 3 (04/26/21)    Visit Diagnosis: surgery follow up. EGD/Colon scheduled for 6/16/21. She states that her energy levels are still low. She has not fully recovered. She is taking PO iron + Vit C at home with minor GI complaints. She is on PO B12 at home as well.  RUE swelling has improve Tobacco Use      Smoking status: Former Smoker        Quit date: 8/15/2003        Years since quittin.7      Smokeless tobacco: Never Used     History reviewed. No pertinent family history.     Physical Exam  Height: 170.2 cm (5' 7.01\") ( 1353)    Vitamin B12 Deficiency   · 190 on admission. Negative Intrinsic factor   · IM B12 today   · Daily PO B12     Lymphocytopenia and mild thrombocytopeniaL Resolved   · Likely 2/2 COVID.  Possibly related to severe SANDRA  · B12 as above  · Normal Folic acid

## 2021-05-11 ENCOUNTER — OFFICE VISIT (OUTPATIENT)
Dept: HEMATOLOGY/ONCOLOGY | Facility: HOSPITAL | Age: 46
End: 2021-05-11
Attending: INTERNAL MEDICINE
Payer: COMMERCIAL

## 2021-05-11 ENCOUNTER — TELEPHONE (OUTPATIENT)
Dept: HEMATOLOGY/ONCOLOGY | Facility: HOSPITAL | Age: 46
End: 2021-05-11

## 2021-05-11 VITALS
HEART RATE: 71 BPM | DIASTOLIC BLOOD PRESSURE: 89 MMHG | HEIGHT: 67.01 IN | SYSTOLIC BLOOD PRESSURE: 155 MMHG | WEIGHT: 235 LBS | RESPIRATION RATE: 16 BRPM | OXYGEN SATURATION: 98 % | TEMPERATURE: 97 F | BODY MASS INDEX: 36.88 KG/M2

## 2021-05-11 DIAGNOSIS — E53.8 VITAMIN B12 DEFICIENCY: Primary | ICD-10-CM

## 2021-05-11 DIAGNOSIS — D50.0 IRON DEFICIENCY ANEMIA DUE TO CHRONIC BLOOD LOSS: Primary | ICD-10-CM

## 2021-05-11 DIAGNOSIS — E53.8 VITAMIN B12 DEFICIENCY: ICD-10-CM

## 2021-05-11 PROCEDURE — 96372 THER/PROPH/DIAG INJ SC/IM: CPT

## 2021-05-11 PROCEDURE — 99214 OFFICE O/P EST MOD 30 MIN: CPT | Performed by: INTERNAL MEDICINE

## 2021-05-11 RX ORDER — CYANOCOBALAMIN 1000 UG/ML
1000 INJECTION INTRAMUSCULAR; SUBCUTANEOUS ONCE
Status: COMPLETED | OUTPATIENT
Start: 2021-05-11 | End: 2021-05-11

## 2021-05-11 RX ORDER — CYANOCOBALAMIN 1000 UG/ML
1000 INJECTION INTRAMUSCULAR; SUBCUTANEOUS ONCE
Status: CANCELLED | OUTPATIENT
Start: 2021-06-08

## 2021-05-11 RX ORDER — CYANOCOBALAMIN 1000 UG/ML
1000 INJECTION INTRAMUSCULAR; SUBCUTANEOUS ONCE
Status: CANCELLED | OUTPATIENT
Start: 2021-05-11

## 2021-05-11 RX ADMIN — CYANOCOBALAMIN 1000 MCG: 1000 INJECTION INTRAMUSCULAR; SUBCUTANEOUS at 14:28:00

## 2021-05-11 NOTE — PROGRESS NOTES
Education Record    Learner:  Patient    Disease / Tunde January     Barriers / Limitations:  None   Comments:    Method:  Discussion   Comments:    General Topics:  Plan of care reviewed   Comments:    Outcome:  Shows understanding   Comments:    Ria

## 2021-05-11 NOTE — TELEPHONE ENCOUNTER
Spoke with patient. She verbalized understanding. Kelsea Cohen MD  P Edw Radha Hughes Rns  Results reviewed. B12 is now normal. Continue with PO B12 at home. Continue with oral iron. White blood cells and Platelets have normalized.  Her Hb is almost ba

## 2021-05-20 ENCOUNTER — TELEPHONE (OUTPATIENT)
Dept: HEMATOLOGY/ONCOLOGY | Facility: HOSPITAL | Age: 46
End: 2021-05-20

## 2021-05-20 NOTE — TELEPHONE ENCOUNTER
Toxicities: Vitamin B12/Ferrous Sulfate/Ascorbic Acid    Facial Flushing: (Over the last week Emily has been having facial flushing. No rash. No redness anywhere else on her body.  She wants to know if any of the medications Dr Syl Coelho ordered can cause facial

## 2021-05-20 NOTE — TELEPHONE ENCOUNTER
Spoke with patient. She would feel more comfortable holding her oral iron for a few days to see if facial flushing resolves. She will check in on Monday 5/24/21.

## 2021-06-08 ENCOUNTER — APPOINTMENT (OUTPATIENT)
Dept: HEMATOLOGY/ONCOLOGY | Facility: HOSPITAL | Age: 46
End: 2021-06-08
Attending: INTERNAL MEDICINE
Payer: COMMERCIAL

## 2021-06-16 PROCEDURE — 88305 TISSUE EXAM BY PATHOLOGIST: CPT | Performed by: INTERNAL MEDICINE

## 2021-07-06 ENCOUNTER — APPOINTMENT (OUTPATIENT)
Dept: HEMATOLOGY/ONCOLOGY | Facility: HOSPITAL | Age: 46
End: 2021-07-06
Attending: INTERNAL MEDICINE
Payer: COMMERCIAL

## 2021-08-02 NOTE — PROGRESS NOTES
[facility] Hematology and Oncology Clinic Note    Diagnosis:   1. Iron deficiency Anemia 2/2 NSAIDs and suspected GI blood loss  2. B12 deficiency     Treatment History:   1. IM B12 q month    2.  IV Iron  -IV Venofer 200 mg x 3 (04/26/21)    Visit Diagnosis: surgery follow up. EGD/Colon scheduled for 6/16/21. She states that her energy levels are still low. She has not fully recovered. She is taking PO iron + Vit C at home with minor GI complaints. She is on PO B12 at home as well.  RUE swelling has improve History      Marital status:       Spouse name: Not on file      Number of children: Not on file      Years of education: Not on file      Highest education level: Not on file    Tobacco Use      Smoking status: Former Smoker        Packs/day: 0.00 (238 lb) (08/03 1317)  BSA (Calculated - sq m): 2.18 sq meters (08/03 1317)  Pulse: 62 (08/03 1317)  BP: 156/90 (08/03 1317)  Temp: 97.7 °F (36.5 °C) (08/03 1317)  Do Not Use - Resp Rate: --  SpO2: 98 % (08/03 1317)\    General: NAD, AOX3  HEENT: clear op, acid  · Copper levels normal     RUE SVT: Dx 4/26/21. Continue with supportive care. Restart warm compresses and elevation. Do not recommend anticoagulation given significant SANDRA    COVID 19: Dx 04/2021    RTC in 6 months     NAMAN Trinidad

## 2021-08-03 ENCOUNTER — OFFICE VISIT (OUTPATIENT)
Dept: HEMATOLOGY/ONCOLOGY | Facility: HOSPITAL | Age: 46
End: 2021-08-03
Attending: INTERNAL MEDICINE
Payer: COMMERCIAL

## 2021-08-03 VITALS
HEIGHT: 67.01 IN | BODY MASS INDEX: 37.35 KG/M2 | WEIGHT: 238 LBS | HEART RATE: 62 BPM | OXYGEN SATURATION: 98 % | RESPIRATION RATE: 16 BRPM | TEMPERATURE: 98 F | SYSTOLIC BLOOD PRESSURE: 156 MMHG | DIASTOLIC BLOOD PRESSURE: 90 MMHG

## 2021-08-03 DIAGNOSIS — D50.0 IRON DEFICIENCY ANEMIA DUE TO CHRONIC BLOOD LOSS: ICD-10-CM

## 2021-08-03 DIAGNOSIS — E53.8 VITAMIN B12 DEFICIENCY: Primary | ICD-10-CM

## 2021-08-03 LAB
BASOPHILS # BLD AUTO: 0.03 X10(3) UL (ref 0–0.2)
BASOPHILS NFR BLD AUTO: 0.5 %
DEPRECATED HBV CORE AB SER IA-ACNC: 7.5 NG/ML
EOSINOPHIL # BLD AUTO: 0.22 X10(3) UL (ref 0–0.7)
EOSINOPHIL NFR BLD AUTO: 3.3 %
ERYTHROCYTE [DISTWIDTH] IN BLOOD BY AUTOMATED COUNT: 13.2 %
HCT VFR BLD AUTO: 42.5 %
HGB BLD-MCNC: 14 G/DL
IMM GRANULOCYTES # BLD AUTO: 0.01 X10(3) UL (ref 0–1)
IMM GRANULOCYTES NFR BLD: 0.2 %
IRON SATURATION: 14 %
IRON SERPL-MCNC: 60 UG/DL
LYMPHOCYTES # BLD AUTO: 2 X10(3) UL (ref 1–4)
LYMPHOCYTES NFR BLD AUTO: 30.1 %
MCH RBC QN AUTO: 26.8 PG (ref 26–34)
MCHC RBC AUTO-ENTMCNC: 32.9 G/DL (ref 31–37)
MCV RBC AUTO: 81.4 FL
MONOCYTES # BLD AUTO: 0.46 X10(3) UL (ref 0.1–1)
MONOCYTES NFR BLD AUTO: 6.9 %
NEUTROPHILS # BLD AUTO: 3.93 X10 (3) UL (ref 1.5–7.7)
NEUTROPHILS # BLD AUTO: 3.93 X10(3) UL (ref 1.5–7.7)
NEUTROPHILS NFR BLD AUTO: 59 %
PLATELET # BLD AUTO: 232 10(3)UL (ref 150–450)
RBC # BLD AUTO: 5.22 X10(6)UL
TOTAL IRON BINDING CAPACITY: 416 UG/DL (ref 240–450)
TRANSFERRIN SERPL-MCNC: 279 MG/DL (ref 200–360)
VIT B12 SERPL-MCNC: 311 PG/ML (ref 193–986)
WBC # BLD AUTO: 6.7 X10(3) UL (ref 4–11)

## 2021-08-03 PROCEDURE — 99213 OFFICE O/P EST LOW 20 MIN: CPT | Performed by: INTERNAL MEDICINE

## 2021-08-03 PROCEDURE — 96372 THER/PROPH/DIAG INJ SC/IM: CPT

## 2021-08-03 RX ORDER — CYANOCOBALAMIN 1000 UG/ML
1000 INJECTION INTRAMUSCULAR; SUBCUTANEOUS ONCE
Status: COMPLETED | OUTPATIENT
Start: 2021-08-03 | End: 2021-08-03

## 2021-08-03 RX ORDER — CYANOCOBALAMIN 1000 UG/ML
1000 INJECTION INTRAMUSCULAR; SUBCUTANEOUS ONCE
Status: CANCELLED | OUTPATIENT
Start: 2021-08-31

## 2021-08-03 RX ADMIN — CYANOCOBALAMIN 1000 MCG: 1000 INJECTION INTRAMUSCULAR; SUBCUTANEOUS at 14:06:00

## 2021-08-03 NOTE — PROGRESS NOTES
Education Record    Learner:  Patient    Disease / Diagnosis: vitamin b12 deficiency    Barriers / Limitations:  None   Comments:    Method:  Brief focused   Comments:    General Topics:  Side effects and symptom management and Plan of care reviewed   Comm

## 2022-02-01 ENCOUNTER — OFFICE VISIT (OUTPATIENT)
Dept: HEMATOLOGY/ONCOLOGY | Facility: HOSPITAL | Age: 47
End: 2022-02-01
Attending: INTERNAL MEDICINE
Payer: COMMERCIAL

## 2022-02-01 VITALS
DIASTOLIC BLOOD PRESSURE: 96 MMHG | WEIGHT: 244 LBS | HEART RATE: 67 BPM | RESPIRATION RATE: 18 BRPM | BODY MASS INDEX: 38 KG/M2 | OXYGEN SATURATION: 98 % | SYSTOLIC BLOOD PRESSURE: 160 MMHG | TEMPERATURE: 98 F

## 2022-02-01 DIAGNOSIS — E53.8 VITAMIN B12 DEFICIENCY: Primary | ICD-10-CM

## 2022-02-01 DIAGNOSIS — D50.0 IRON DEFICIENCY ANEMIA DUE TO CHRONIC BLOOD LOSS: ICD-10-CM

## 2022-02-01 LAB
BASOPHILS # BLD AUTO: 0.03 X10(3) UL (ref 0–0.2)
BASOPHILS NFR BLD AUTO: 0.5 %
DEPRECATED HBV CORE AB SER IA-ACNC: 7.1 NG/ML
EOSINOPHIL # BLD AUTO: 0.24 X10(3) UL (ref 0–0.7)
EOSINOPHIL NFR BLD AUTO: 3.7 %
ERYTHROCYTE [DISTWIDTH] IN BLOOD BY AUTOMATED COUNT: 13.4 %
HCT VFR BLD AUTO: 42.8 %
HGB BLD-MCNC: 13.9 G/DL
IMM GRANULOCYTES # BLD AUTO: 0.02 X10(3) UL (ref 0–1)
IMM GRANULOCYTES NFR BLD: 0.3 %
IRON SATN MFR SERPL: 13 %
IRON SERPL-MCNC: 51 UG/DL
LYMPHOCYTES # BLD AUTO: 2.32 X10(3) UL (ref 1–4)
LYMPHOCYTES NFR BLD AUTO: 35.5 %
MCH RBC QN AUTO: 26.9 PG (ref 26–34)
MCHC RBC AUTO-ENTMCNC: 32.5 G/DL (ref 31–37)
MCV RBC AUTO: 82.9 FL
MONOCYTES # BLD AUTO: 0.39 X10(3) UL (ref 0.1–1)
MONOCYTES NFR BLD AUTO: 6 %
NEUTROPHILS # BLD AUTO: 3.54 X10 (3) UL (ref 1.5–7.7)
NEUTROPHILS # BLD AUTO: 3.54 X10(3) UL (ref 1.5–7.7)
NEUTROPHILS NFR BLD AUTO: 54 %
PLATELET # BLD AUTO: 183 10(3)UL (ref 150–450)
RBC # BLD AUTO: 5.16 X10(6)UL
TIBC SERPL-MCNC: 398 UG/DL (ref 240–450)
TRANSFERRIN SERPL-MCNC: 267 MG/DL (ref 200–360)
VIT B12 SERPL-MCNC: 249 PG/ML (ref 193–986)
WBC # BLD AUTO: 6.5 X10(3) UL (ref 4–11)

## 2022-02-01 PROCEDURE — 99213 OFFICE O/P EST LOW 20 MIN: CPT | Performed by: INTERNAL MEDICINE

## 2022-02-01 PROCEDURE — 96372 THER/PROPH/DIAG INJ SC/IM: CPT

## 2022-02-01 RX ORDER — CYANOCOBALAMIN 1000 UG/ML
1000 INJECTION INTRAMUSCULAR; SUBCUTANEOUS ONCE
OUTPATIENT
Start: 2022-02-15

## 2022-02-01 RX ORDER — CYANOCOBALAMIN 1000 UG/ML
1000 INJECTION INTRAMUSCULAR; SUBCUTANEOUS ONCE
Status: COMPLETED | OUTPATIENT
Start: 2022-02-01 | End: 2022-02-01

## 2022-02-01 RX ADMIN — CYANOCOBALAMIN 1000 MCG: 1000 INJECTION INTRAMUSCULAR; SUBCUTANEOUS at 13:30:00

## 2022-02-01 NOTE — PROGRESS NOTES
Education Record    Learner:  Patient    Disease / Diagnosis:iron deficiency anemia    Barriers / Limitations:  None   Comments:    Method:  Discussion   Comments:    General Topics:  Plan of care reviewed   Comments:    Outcome:  Shows understanding   Comments:    Patient here for follow-up and possible B12 injection. Denies any signs of active bleeding. Energy levels are stable. Stopped oral iron. Denies any dyspnea or lightheadedness.

## 2022-02-01 NOTE — PROGRESS NOTES
Education Record    Learner:  Patient    Disease / Tre Barber B12 deficiency    Barriers / Limitations:  None   Comments:    Method:  Brief focused   Comments:    General Topics:  Infection, Medication, Pain, Precautions, Procedure, Side effects and symptom management, Plan of care reviewed and Fall risk and prevention   Comments:    Outcome:  Shows understanding   Comments:

## 2023-10-30 ENCOUNTER — OFFICE VISIT (OUTPATIENT)
Dept: FAMILY MEDICINE CLINIC | Facility: CLINIC | Age: 48
End: 2023-10-30

## 2023-10-30 VITALS
OXYGEN SATURATION: 97 % | BODY MASS INDEX: 40.59 KG/M2 | WEIGHT: 258.63 LBS | HEART RATE: 89 BPM | TEMPERATURE: 98 F | DIASTOLIC BLOOD PRESSURE: 100 MMHG | HEIGHT: 67.08 IN | SYSTOLIC BLOOD PRESSURE: 152 MMHG | RESPIRATION RATE: 18 BRPM

## 2023-10-30 DIAGNOSIS — D69.6 THROMBOCYTOPENIA (HCC): ICD-10-CM

## 2023-10-30 DIAGNOSIS — R03.0 ELEVATED BLOOD PRESSURE READING WITHOUT DIAGNOSIS OF HYPERTENSION: ICD-10-CM

## 2023-10-30 DIAGNOSIS — Z13.6 SCREENING FOR CARDIOVASCULAR CONDITION: ICD-10-CM

## 2023-10-30 DIAGNOSIS — Z13.29 SCREENING FOR ENDOCRINE, METABOLIC, AND IMMUNITY DISORDER: ICD-10-CM

## 2023-10-30 DIAGNOSIS — E53.8 VITAMIN B12 DEFICIENCY: ICD-10-CM

## 2023-10-30 DIAGNOSIS — D50.9 IRON DEFICIENCY ANEMIA, UNSPECIFIED IRON DEFICIENCY ANEMIA TYPE: ICD-10-CM

## 2023-10-30 DIAGNOSIS — L98.9 SKIN LESION OF FACE: Primary | ICD-10-CM

## 2023-10-30 DIAGNOSIS — Z13.0 SCREENING FOR ENDOCRINE, METABOLIC, AND IMMUNITY DISORDER: ICD-10-CM

## 2023-10-30 DIAGNOSIS — L92.0 GRANULOMA ANNULARE: ICD-10-CM

## 2023-10-30 DIAGNOSIS — Z13.228 SCREENING FOR ENDOCRINE, METABOLIC, AND IMMUNITY DISORDER: ICD-10-CM

## 2023-10-30 DIAGNOSIS — Z23 NEED FOR VACCINATION: ICD-10-CM

## 2023-10-30 PROBLEM — D72.819 LEUKOPENIA, UNSPECIFIED TYPE: Status: RESOLVED | Noted: 2021-04-22 | Resolved: 2023-10-30

## 2023-10-30 PROBLEM — E87.6 HYPOKALEMIA: Status: RESOLVED | Noted: 2021-04-22 | Resolved: 2023-10-30

## 2023-10-30 PROBLEM — D64.9 ANEMIA, UNSPECIFIED TYPE: Status: RESOLVED | Noted: 2021-04-22 | Resolved: 2023-10-30

## 2023-10-30 PROCEDURE — 99203 OFFICE O/P NEW LOW 30 MIN: CPT | Performed by: FAMILY MEDICINE

## 2023-10-30 PROCEDURE — 3080F DIAST BP >= 90 MM HG: CPT | Performed by: FAMILY MEDICINE

## 2023-10-30 PROCEDURE — 90686 IIV4 VACC NO PRSV 0.5 ML IM: CPT | Performed by: FAMILY MEDICINE

## 2023-10-30 PROCEDURE — 90471 IMMUNIZATION ADMIN: CPT | Performed by: FAMILY MEDICINE

## 2023-10-30 PROCEDURE — 3008F BODY MASS INDEX DOCD: CPT | Performed by: FAMILY MEDICINE

## 2023-10-30 PROCEDURE — 3077F SYST BP >= 140 MM HG: CPT | Performed by: FAMILY MEDICINE

## 2023-11-06 ENCOUNTER — LAB ENCOUNTER (OUTPATIENT)
Dept: LAB | Age: 48
End: 2023-11-06
Attending: FAMILY MEDICINE
Payer: COMMERCIAL

## 2023-11-06 ENCOUNTER — NURSE ONLY (OUTPATIENT)
Dept: FAMILY MEDICINE CLINIC | Facility: CLINIC | Age: 48
End: 2023-11-06
Payer: COMMERCIAL

## 2023-11-06 DIAGNOSIS — Z13.6 SCREENING FOR CARDIOVASCULAR CONDITION: ICD-10-CM

## 2023-11-06 DIAGNOSIS — Z13.228 SCREENING FOR ENDOCRINE, METABOLIC, AND IMMUNITY DISORDER: ICD-10-CM

## 2023-11-06 DIAGNOSIS — D50.9 IRON DEFICIENCY ANEMIA, UNSPECIFIED IRON DEFICIENCY ANEMIA TYPE: ICD-10-CM

## 2023-11-06 DIAGNOSIS — Z13.0 SCREENING FOR ENDOCRINE, METABOLIC, AND IMMUNITY DISORDER: ICD-10-CM

## 2023-11-06 DIAGNOSIS — E53.8 VITAMIN B12 DEFICIENCY: ICD-10-CM

## 2023-11-06 DIAGNOSIS — Z13.29 SCREENING FOR ENDOCRINE, METABOLIC, AND IMMUNITY DISORDER: ICD-10-CM

## 2023-11-06 LAB
ALBUMIN SERPL-MCNC: 3.9 G/DL (ref 3.4–5)
ALBUMIN/GLOB SERPL: 1.1 {RATIO} (ref 1–2)
ALP LIVER SERPL-CCNC: 78 U/L
ALT SERPL-CCNC: 53 U/L
ANION GAP SERPL CALC-SCNC: 4 MMOL/L (ref 0–18)
AST SERPL-CCNC: 25 U/L (ref 15–37)
BASOPHILS # BLD AUTO: 0.03 X10(3) UL (ref 0–0.2)
BASOPHILS NFR BLD AUTO: 0.5 %
BILIRUB SERPL-MCNC: 1.6 MG/DL (ref 0.1–2)
BUN BLD-MCNC: 13 MG/DL (ref 9–23)
CALCIUM BLD-MCNC: 9 MG/DL (ref 8.5–10.1)
CHLORIDE SERPL-SCNC: 107 MMOL/L (ref 98–112)
CHOLEST SERPL-MCNC: 193 MG/DL (ref ?–200)
CO2 SERPL-SCNC: 27 MMOL/L (ref 21–32)
CREAT BLD-MCNC: 1.02 MG/DL
DEPRECATED HBV CORE AB SER IA-ACNC: 11.3 NG/ML
EGFRCR SERPLBLD CKD-EPI 2021: 67 ML/MIN/1.73M2 (ref 60–?)
EOSINOPHIL # BLD AUTO: 0.25 X10(3) UL (ref 0–0.7)
EOSINOPHIL NFR BLD AUTO: 4.5 %
ERYTHROCYTE [DISTWIDTH] IN BLOOD BY AUTOMATED COUNT: 13.3 %
FASTING PATIENT LIPID ANSWER: YES
FASTING STATUS PATIENT QL REPORTED: YES
GLOBULIN PLAS-MCNC: 3.7 G/DL (ref 2.8–4.4)
GLUCOSE BLD-MCNC: 123 MG/DL (ref 70–99)
HCT VFR BLD AUTO: 43.3 %
HDLC SERPL-MCNC: 45 MG/DL (ref 40–59)
HGB BLD-MCNC: 14.6 G/DL
IMM GRANULOCYTES # BLD AUTO: 0.02 X10(3) UL (ref 0–1)
IMM GRANULOCYTES NFR BLD: 0.4 %
IRON SATN MFR SERPL: 17 %
IRON SERPL-MCNC: 69 UG/DL
LDLC SERPL CALC-MCNC: 133 MG/DL (ref ?–100)
LYMPHOCYTES # BLD AUTO: 2.17 X10(3) UL (ref 1–4)
LYMPHOCYTES NFR BLD AUTO: 39.1 %
MCH RBC QN AUTO: 28.1 PG (ref 26–34)
MCHC RBC AUTO-ENTMCNC: 33.7 G/DL (ref 31–37)
MCV RBC AUTO: 83.3 FL
MONOCYTES # BLD AUTO: 0.33 X10(3) UL (ref 0.1–1)
MONOCYTES NFR BLD AUTO: 5.9 %
NEUTROPHILS # BLD AUTO: 2.75 X10 (3) UL (ref 1.5–7.7)
NEUTROPHILS # BLD AUTO: 2.75 X10(3) UL (ref 1.5–7.7)
NEUTROPHILS NFR BLD AUTO: 49.6 %
NONHDLC SERPL-MCNC: 148 MG/DL (ref ?–130)
OSMOLALITY SERPL CALC.SUM OF ELEC: 287 MOSM/KG (ref 275–295)
PLATELET # BLD AUTO: 191 10(3)UL (ref 150–450)
POTASSIUM SERPL-SCNC: 4 MMOL/L (ref 3.5–5.1)
PROT SERPL-MCNC: 7.6 G/DL (ref 6.4–8.2)
RBC # BLD AUTO: 5.2 X10(6)UL
SODIUM SERPL-SCNC: 138 MMOL/L (ref 136–145)
T4 FREE SERPL-MCNC: 0.8 NG/DL (ref 0.8–1.7)
TIBC SERPL-MCNC: 408 UG/DL (ref 240–450)
TRANSFERRIN SERPL-MCNC: 274 MG/DL (ref 200–360)
TRIGL SERPL-MCNC: 84 MG/DL (ref 30–149)
TSI SER-ACNC: 7.51 MIU/ML (ref 0.36–3.74)
VIT B12 SERPL-MCNC: 342 PG/ML (ref 193–986)
VLDLC SERPL CALC-MCNC: 15 MG/DL (ref 0–30)
WBC # BLD AUTO: 5.6 X10(3) UL (ref 4–11)

## 2023-11-06 PROCEDURE — 82728 ASSAY OF FERRITIN: CPT | Performed by: FAMILY MEDICINE

## 2023-11-06 PROCEDURE — 83036 HEMOGLOBIN GLYCOSYLATED A1C: CPT | Performed by: FAMILY MEDICINE

## 2023-11-06 PROCEDURE — 80053 COMPREHEN METABOLIC PANEL: CPT | Performed by: FAMILY MEDICINE

## 2023-11-06 PROCEDURE — 84443 ASSAY THYROID STIM HORMONE: CPT | Performed by: FAMILY MEDICINE

## 2023-11-06 PROCEDURE — 84439 ASSAY OF FREE THYROXINE: CPT | Performed by: FAMILY MEDICINE

## 2023-11-06 PROCEDURE — 83540 ASSAY OF IRON: CPT | Performed by: FAMILY MEDICINE

## 2023-11-06 PROCEDURE — 85025 COMPLETE CBC W/AUTO DIFF WBC: CPT | Performed by: FAMILY MEDICINE

## 2023-11-06 PROCEDURE — 83550 IRON BINDING TEST: CPT | Performed by: FAMILY MEDICINE

## 2023-11-06 PROCEDURE — 36415 COLL VENOUS BLD VENIPUNCTURE: CPT

## 2023-11-06 PROCEDURE — 80061 LIPID PANEL: CPT | Performed by: FAMILY MEDICINE

## 2023-11-06 PROCEDURE — 82607 VITAMIN B-12: CPT

## 2023-11-07 DIAGNOSIS — E78.2 MIXED HYPERLIPIDEMIA: ICD-10-CM

## 2023-11-07 DIAGNOSIS — R73.03 PREDIABETES: ICD-10-CM

## 2023-11-07 DIAGNOSIS — R73.01 ELEVATED FASTING GLUCOSE: Primary | ICD-10-CM

## 2023-11-07 DIAGNOSIS — R79.89 ABNORMAL TSH: ICD-10-CM

## 2023-11-07 LAB
EST. AVERAGE GLUCOSE BLD GHB EST-MCNC: 120 MG/DL (ref 68–126)
HBA1C MFR BLD: 5.8 % (ref ?–5.7)

## 2023-11-21 ENCOUNTER — LAB REQUISITION (OUTPATIENT)
Dept: LAB | Facility: HOSPITAL | Age: 48
End: 2023-11-21
Payer: COMMERCIAL

## 2023-11-21 DIAGNOSIS — D48.5 NEOPLASM OF UNCERTAIN BEHAVIOR OF SKIN: ICD-10-CM

## 2023-11-21 PROCEDURE — 88305 TISSUE EXAM BY PATHOLOGIST: CPT | Performed by: PHYSICIAN ASSISTANT

## 2023-12-04 ENCOUNTER — OFFICE VISIT (OUTPATIENT)
Dept: FAMILY MEDICINE CLINIC | Facility: CLINIC | Age: 48
End: 2023-12-04
Payer: COMMERCIAL

## 2023-12-04 VITALS
DIASTOLIC BLOOD PRESSURE: 90 MMHG | SYSTOLIC BLOOD PRESSURE: 140 MMHG | HEIGHT: 67.95 IN | HEART RATE: 77 BPM | TEMPERATURE: 98 F | WEIGHT: 259.81 LBS | BODY MASS INDEX: 39.38 KG/M2 | RESPIRATION RATE: 18 BRPM | OXYGEN SATURATION: 96 %

## 2023-12-04 DIAGNOSIS — R73.03 PREDIABETES: ICD-10-CM

## 2023-12-04 DIAGNOSIS — Z12.4 SCREENING FOR CERVICAL CANCER: ICD-10-CM

## 2023-12-04 DIAGNOSIS — I10 BENIGN ESSENTIAL HYPERTENSION: ICD-10-CM

## 2023-12-04 DIAGNOSIS — Z23 NEED FOR VACCINATION: ICD-10-CM

## 2023-12-04 DIAGNOSIS — Z12.31 ENCOUNTER FOR SCREENING MAMMOGRAM FOR MALIGNANT NEOPLASM OF BREAST: ICD-10-CM

## 2023-12-04 DIAGNOSIS — E78.2 MIXED HYPERLIPIDEMIA: ICD-10-CM

## 2023-12-04 DIAGNOSIS — Z00.00 ANNUAL PHYSICAL EXAM: Primary | ICD-10-CM

## 2023-12-04 PROCEDURE — 87624 HPV HI-RISK TYP POOLED RSLT: CPT | Performed by: FAMILY MEDICINE

## 2023-12-04 PROCEDURE — 88175 CYTOPATH C/V AUTO FLUID REDO: CPT | Performed by: FAMILY MEDICINE

## 2023-12-04 RX ORDER — DESONIDE 0.5 MG/G
CREAM TOPICAL
COMMUNITY
Start: 2023-12-01

## 2023-12-04 RX ORDER — LISINOPRIL 10 MG/1
10 TABLET ORAL DAILY
Qty: 90 TABLET | Refills: 1 | Status: SHIPPED | OUTPATIENT
Start: 2023-12-04

## 2023-12-05 LAB — HPV I/H RISK 1 DNA SPEC QL NAA+PROBE: NEGATIVE

## 2023-12-08 LAB
.: NORMAL
.: NORMAL

## 2023-12-28 ENCOUNTER — TELEPHONE (OUTPATIENT)
Dept: FAMILY MEDICINE CLINIC | Facility: CLINIC | Age: 48
End: 2023-12-28

## 2023-12-28 DIAGNOSIS — I10 BENIGN ESSENTIAL HYPERTENSION: ICD-10-CM

## 2023-12-28 RX ORDER — LISINOPRIL 20 MG/1
20 TABLET ORAL DAILY
Qty: 30 TABLET | Refills: 1 | Status: SHIPPED | OUTPATIENT
Start: 2023-12-28

## 2023-12-28 NOTE — TELEPHONE ENCOUNTER
Called pt, reports her BP has been running high 130s to 140s over high 80s. Denies s/e of medication, headaches, or dizziness. Pt wants to know if PCP will make changes to medication. Routed to provider for review and advice.

## 2023-12-28 NOTE — TELEPHONE ENCOUNTER
Pt called office stating her that since she started her medication her BP has been high. The lowest top number is 130's and into the 140's. The bottom lowest has bee 85's and highest has been 87/90.

## 2023-12-29 NOTE — TELEPHONE ENCOUNTER
Please call pt to inform:    - INCREASE Lisinopril to 20 mg (from 10 mg) daily. She can finish what she has at home (take 10 mg tab x 2) OR she can pickup the new Rx I sent in of the 20 mg tab. - keep BP log over the next 2 wks then please call in or send [facility] message with BP readings. Thanks.

## 2024-01-19 ENCOUNTER — HOSPITAL ENCOUNTER (OUTPATIENT)
Dept: MAMMOGRAPHY | Age: 49
Discharge: HOME | End: 2024-01-19
Attending: FAMILY MEDICINE
Payer: COMMERCIAL

## 2024-01-19 DIAGNOSIS — Z12.31 ENCOUNTER FOR SCREENING MAMMOGRAM FOR MALIGNANT NEOPLASM OF BREAST: ICD-10-CM

## 2024-01-19 PROCEDURE — 77067 SCR MAMMO BI INCL CAD: CPT | Performed by: FAMILY MEDICINE

## 2024-01-19 PROCEDURE — 77063 BREAST TOMOSYNTHESIS BI: CPT | Performed by: FAMILY MEDICINE

## 2024-01-23 ENCOUNTER — PATIENT MESSAGE (OUTPATIENT)
Dept: FAMILY MEDICINE CLINIC | Facility: CLINIC | Age: 49
End: 2024-01-23

## 2024-01-23 DIAGNOSIS — I10 BENIGN ESSENTIAL HYPERTENSION: Primary | ICD-10-CM

## 2024-01-24 NOTE — TELEPHONE ENCOUNTER
Benign essential hypertension  - BP elevated today (home BP readings also elevated)  - 11/2023 BMP with normal lytes and SCr  -START Lisinopril 10 mg daily, R/B/SE of new med d/w pt  - d/w pt a healthy, low-sodium diet, regular exercise, and wt loss  - RTC in 2 wks or may send in [facility] with BP readings for BP f-u     - lisinopril 10 MG Oral Tab; Take 1 tablet (10 mg total) by mouth daily.  Dispense: 90 tablet; Refill: 1

## 2024-01-24 NOTE — TELEPHONE ENCOUNTER
From: Emily Pitts  To: Rosemary Bray  Sent: 1/23/2024 7:22 PM CST  Subject: Blood Pressure log since new 20mg dosing    BP  Tues 1/2 6:30pm  141/89  Thur 1/4 7pm  139/90  Fri 1/5 4pm  126/81  Sat 1/6 1pm  127/84  Mon 1/7 10pm  124/82  Tues 1/8 3pm  128/84  Thurs 1/10 7pm  133/87  Tues1/16 8pm  152/90  Wed 1/17 9:30pm  137/86  Thur 1/18 10pm  127/87  Tues 1/23 7pm  139/89

## 2024-01-25 RX ORDER — LISINOPRIL AND HYDROCHLOROTHIAZIDE 20; 12.5 MG/1; MG/1
1 TABLET ORAL DAILY
Qty: 30 TABLET | Refills: 1 | Status: SHIPPED | OUTPATIENT
Start: 2024-01-25

## 2024-03-09 ENCOUNTER — OFFICE VISIT (OUTPATIENT)
Dept: FAMILY MEDICINE CLINIC | Facility: CLINIC | Age: 49
End: 2024-03-09
Payer: COMMERCIAL

## 2024-03-09 VITALS
BODY MASS INDEX: 37.67 KG/M2 | HEIGHT: 67 IN | DIASTOLIC BLOOD PRESSURE: 88 MMHG | TEMPERATURE: 99 F | WEIGHT: 240 LBS | HEART RATE: 76 BPM | OXYGEN SATURATION: 97 % | SYSTOLIC BLOOD PRESSURE: 136 MMHG | RESPIRATION RATE: 16 BRPM

## 2024-03-09 DIAGNOSIS — J98.01 COUGH DUE TO BRONCHOSPASM: Primary | ICD-10-CM

## 2024-03-09 PROCEDURE — 3075F SYST BP GE 130 - 139MM HG: CPT | Performed by: NURSE PRACTITIONER

## 2024-03-09 PROCEDURE — 3079F DIAST BP 80-89 MM HG: CPT | Performed by: NURSE PRACTITIONER

## 2024-03-09 PROCEDURE — 3008F BODY MASS INDEX DOCD: CPT | Performed by: NURSE PRACTITIONER

## 2024-03-09 PROCEDURE — 99213 OFFICE O/P EST LOW 20 MIN: CPT | Performed by: NURSE PRACTITIONER

## 2024-03-09 RX ORDER — BENZONATATE 200 MG/1
200 CAPSULE ORAL 3 TIMES DAILY PRN
Qty: 45 CAPSULE | Refills: 0 | Status: SHIPPED | OUTPATIENT
Start: 2024-03-09

## 2024-03-09 NOTE — PROGRESS NOTES
CHIEF COMPLAINT:     Chief Complaint   Patient presents with    Cough     X 4 days - worsening, occasionally productive, otc delsym, mucinex          HPI:   Emily Pitts is a 48 year old female who presents for cough for  4  days.  Cough started gradually and is described as tight and deep.  Cough was  a little productive at first now it is dry. Other triggers for the cough: talking, deep breaths Home treatments include: delsym, mucinex   Associated symptoms include: headache from coughing fits.        Current Outpatient Medications   Medication Sig Dispense Refill    benzonatate 200 MG Oral Cap Take 1 capsule (200 mg total) by mouth 3 (three) times daily as needed for cough. 45 capsule 0    lisinopril-hydroCHLOROthiazide 20-12.5 MG Oral Tab Take 1 tablet by mouth daily. 30 tablet 1    hydrocortisone 2.5 % External Cream Apply 1 Application topically 2 (two) times daily.      desonide 0.05 % External Cream       Vitamin B-12 1000 MCG Oral Tab Take 1 tablet (1,000 mcg total) by mouth daily. (Patient not taking: Reported on 10/30/2023) 30 tablet 3      Past Medical History:   Diagnosis Date    Anemia     Back pain ?    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    Just dried blood and tinges of blood when I blow my nose,    Body piercing     Decorative tattoo     Fatigue No idea    Hemorrhoids     During pregnancy    Leaking of urine ?    Skin blushing/flushing Recently, past couple weeks    Sleep disturbance No idea      Social History:  Social History     Socioeconomic History    Marital status:    Tobacco Use    Smoking status: Former     Packs/day: 0.00     Years: 0.00     Additional pack years: 0.00     Total pack years: 0.00     Types: Cigarettes     Quit date: 3/31/2004     Years since quittin.9    Smokeless tobacco: Never   Substance and Sexual Activity    Alcohol use: Yes     Comment: Occasional beer    Drug use: Not  Currently     Types: Cannabis     Comment: In high school and college        REVIEW OF SYSTEMS:   GENERAL: No fever or chills.  SKIN: No rashes, or other skin lesions.   EYES: Denies blurred vision or double vision.  HENT: Denies ear pain, decreased hearing, or sore throat.  Reports mild sinus congestion.  CARDIOVASCULAR: Denies chest pain or palpitations  LUNGS: Per HPI. Denies shortness of breath with exertion or rest.   GI: Denies N/V/C/D or abdominal pain.      EXAM:   /88   Pulse 76   Temp 98.9 °F (37.2 °C)   Resp 16   Ht 5' 7\" (1.702 m)   Wt 240 lb (108.9 kg)   SpO2 97%   BMI 37.59 kg/m²   GENERAL: well developed, well nourished,in no apparent distress  SKIN: no rashes, no suspicious lesions  EYES: Conjunctiva clear.  No scleral icterus.  HENT: Atraumatic, normocephalic.  TM's clear bilaterally.  Nostrils patent, nasal mucosa pink and non-inflamed.  No erythema of the throat.  NECK: supple, non-tender.  LUNGS: Normal respiratory rate. Normal effort.  Dry cough. no wheezing. No rales or crackles. No dullness on percussion of anterior and posterior chest wall. No decreased BS. Normal tactile fremitus. Normal egophony.  CARDIO: RRR without murmur  LYMPH: No cervical or supraclavicular lymphadenopathy.   EXTREMITIES:  No clubbing, cyanosis, or edema.    ASSESSMENT AND PLAN:   Emily Pitts is a 48 year old female who presents with:     ASSESSMENT:  Encounter Diagnosis   Name Primary?    Cough due to bronchospasm Yes       PLAN:  Meds as below.  Comfort Care as listed in Patient Instructions.      Meds & Refills for this Visit:  Requested Prescriptions     Signed Prescriptions Disp Refills    benzonatate 200 MG Oral Cap 45 capsule 0     Sig: Take 1 capsule (200 mg total) by mouth 3 (three) times daily as needed for cough.       Side effects, risks, benefits, of medication explained and discussed.    There are no Patient Instructions on file for this visit.    The patient indicates understanding  of these issues and agrees to the plan.  The patient is asked to return if sx's persist or worsen.

## 2024-04-25 DIAGNOSIS — I10 BENIGN ESSENTIAL HYPERTENSION: ICD-10-CM

## 2024-04-25 RX ORDER — LISINOPRIL AND HYDROCHLOROTHIAZIDE 20; 12.5 MG/1; MG/1
1 TABLET ORAL DAILY
Qty: 30 TABLET | Refills: 1 | Status: SHIPPED | OUTPATIENT
Start: 2024-04-25

## 2024-04-25 NOTE — TELEPHONE ENCOUNTER
Refill Passed Protocol:     Pt requesting refill of   Requested Prescriptions     Pending Prescriptions Disp Refills    LISINOPRIL-HYDROCHLOROTHIAZIDE 20-12.5 MG Oral Tab [Pharmacy Med Name: LISINOPRIL-HCTZ 20/12.5MG TABLETS] 30 tablet 1     Sig: TAKE 1 TABLET BY MOUTH DAILY      Refill was approved and sent to pharmacy:     Hypertension Medications Protocol [id number]/25/2024 10:43 AM   Protocol Details CMP or BMP in past 12 months    Last BP reading less than 140/90    In person appointment or virtual visit in the past 12 mos or appointment in next 3 mos    EGFRCR or GFRNAA > 50        Last Time Medication was Filled:  1/25/2024    Last Office Visit with Provider: 12/4/2023    No future appointments.

## 2024-05-21 ENCOUNTER — OFFICE VISIT (OUTPATIENT)
Dept: FAMILY MEDICINE CLINIC | Facility: CLINIC | Age: 49
End: 2024-05-21

## 2024-05-21 VITALS
WEIGHT: 240 LBS | RESPIRATION RATE: 20 BRPM | BODY MASS INDEX: 38 KG/M2 | OXYGEN SATURATION: 98 % | HEART RATE: 75 BPM | SYSTOLIC BLOOD PRESSURE: 116 MMHG | DIASTOLIC BLOOD PRESSURE: 78 MMHG | TEMPERATURE: 98 F

## 2024-05-21 DIAGNOSIS — B02.9 HERPES ZOSTER WITHOUT COMPLICATION: Primary | ICD-10-CM

## 2024-05-21 PROBLEM — J12.82 PNEUMONIA DUE TO COVID-19 VIRUS: Status: RESOLVED | Noted: 2021-04-22 | Resolved: 2024-05-21

## 2024-05-21 PROBLEM — U07.1 PNEUMONIA DUE TO COVID-19 VIRUS: Status: RESOLVED | Noted: 2021-04-22 | Resolved: 2024-05-21

## 2024-05-21 PROCEDURE — 3074F SYST BP LT 130 MM HG: CPT | Performed by: FAMILY MEDICINE

## 2024-05-21 PROCEDURE — 99213 OFFICE O/P EST LOW 20 MIN: CPT | Performed by: FAMILY MEDICINE

## 2024-05-21 PROCEDURE — 3078F DIAST BP <80 MM HG: CPT | Performed by: FAMILY MEDICINE

## 2024-05-21 RX ORDER — VALACYCLOVIR HYDROCHLORIDE 1 G/1
1000 TABLET, FILM COATED ORAL EVERY 8 HOURS
Qty: 21 TABLET | Refills: 0 | Status: SHIPPED | OUTPATIENT
Start: 2024-05-21 | End: 2024-05-28

## 2024-05-21 NOTE — PROGRESS NOTES
HPI:     Emily Pitts is a 49 year old female presents for    Rash.  She normally sees Dr. Bray.  Rashes on the left posterior thigh.  Originally started 4 days ago when she felt the need to itch her posterior thigh.  She felt back there felt 2 small bumps.  Is not sure if there is more now.  Immediately put a Band-Aid coverage over it.  No drainage from the lesions.  No surrounding erythema.  Very mild pain and itching.  Has never had this before.  No new products.  No one else has this.  Does have a remote history of herpes on her lower back does have a remote history of herpes on her lower back and buttock but not sure that this is the same.            Medications (Active prior to today's visit):  Current Outpatient Medications   Medication Sig Dispense Refill    LISINOPRIL-HYDROCHLOROTHIAZIDE 20-12.5 MG Oral Tab TAKE 1 TABLET BY MOUTH DAILY 30 tablet 1    hydrocortisone 2.5 % External Cream Apply 1 Application topically 2 (two) times daily.      desonide 0.05 % External Cream       Vitamin B-12 1000 MCG Oral Tab Take 1 tablet (1,000 mcg total) by mouth daily. 30 tablet 3       Allergies:  Allergies   Allergen Reactions    Penicillins UNKNOWN     Strong family hx of allergy so pt instructed not to take       PSFH elements reviewed from today and agreed except as otherwise stated in HPI.  ROS:      Pertinent positives and negatives noted in the the HPI.    PHYSICAL EXAM:     Vitals:    05/21/24 1339   BP: 116/78   BP Location: Left arm   Patient Position: Sitting   Cuff Size: large   Pulse: 75   Resp: 20   Temp: 97.7 °F (36.5 °C)   TempSrc: Temporal   SpO2: 98%   Weight: 240 lb (108.9 kg)     Vital signs reviewed.Appears stated age, well groomed.  Physical Exam  Constitutional:       Appearance: Normal appearance. She is well-developed and well-groomed.   Skin:         Psychiatric:         Mood and Affect: Mood normal.         Speech: Speech normal.         Behavior: Behavior normal. Behavior is  cooperative.          ASSESSMENT/PLAN:   49 year old female with    1. Herpes zoster without complication      Difficult to determine if these lesions are in fact herpes versus shingles due to them being occluded with a dressing.  Recommend keeping area open to air but keeping it covered with clothing.  Regardless, did discuss that the treatment for both is the same.  Will put on Valtrex 3 times daily for 7 days.  Lesions or not to come into contact with others.  If symptoms worsen acutely or new lesions develop, notify office ASAP  Follow-up with PCP as previously recommended by PCP    Patient/Caregiver Education: There are no barriers to learning. Medical education done.   Outcome: Patient verbalizes understanding and agrees with plan. Advised to call or RTC if symptoms persist or worsen.    5/21/2024  Sue Veras, DO    Patient understands plan and follow-up.

## 2024-06-04 ENCOUNTER — OFFICE VISIT (OUTPATIENT)
Dept: FAMILY MEDICINE CLINIC | Facility: CLINIC | Age: 49
End: 2024-06-04
Payer: COMMERCIAL

## 2024-06-04 VITALS
HEART RATE: 76 BPM | OXYGEN SATURATION: 97 % | BODY MASS INDEX: 37 KG/M2 | TEMPERATURE: 98 F | SYSTOLIC BLOOD PRESSURE: 118 MMHG | RESPIRATION RATE: 20 BRPM | DIASTOLIC BLOOD PRESSURE: 84 MMHG | WEIGHT: 237.63 LBS

## 2024-06-04 DIAGNOSIS — L73.9 FOLLICULITIS: Primary | ICD-10-CM

## 2024-06-04 NOTE — PROGRESS NOTES
HPI:     Emily Pitts is a 49 year old female presents for    Bump under L buttock for 1 day.  Normally sees Dr. Bray.  Is \"paranoid\" as it's the same posterior thigh where she had shingles.  The shingles was lower and has resolved.  This feels like a slight irritation and maybe a small bump.  Does not shave in this region.  No drainage.  No itching or pain.  No one else w/ similar rash.  Has not treated it with anything.  No f/c.          Medications (Active prior to today's visit):  Current Outpatient Medications   Medication Sig Dispense Refill    LISINOPRIL-HYDROCHLOROTHIAZIDE 20-12.5 MG Oral Tab TAKE 1 TABLET BY MOUTH DAILY 30 tablet 1    hydrocortisone 2.5 % External Cream Apply 1 Application topically 2 (two) times daily.      desonide 0.05 % External Cream       Vitamin B-12 1000 MCG Oral Tab Take 1 tablet (1,000 mcg total) by mouth daily. 30 tablet 3       Allergies:  Allergies   Allergen Reactions    Penicillins UNKNOWN     Strong family hx of allergy so pt instructed not to take       PSFH elements reviewed from today and agreed except as otherwise stated in HPI.  ROS:      Pertinent positives and negatives noted in the the HPI.    PHYSICAL EXAM:     Vitals:    06/04/24 1419   BP: 118/84   BP Location: Left arm   Patient Position: Sitting   Cuff Size: large   Pulse: 76   Resp: 20   Temp: 98.1 °F (36.7 °C)   TempSrc: Temporal   SpO2: 97%   Weight: 237 lb 9.6 oz (107.8 kg)     Vital signs reviewed.Appears stated age, well groomed.  Physical Exam  Constitutional:       Appearance: Normal appearance. She is well-developed and well-groomed.   Skin:         Neurological:      Mental Status: She is alert.   Psychiatric:         Mood and Affect: Mood is anxious.         Speech: Speech normal.         Behavior: Behavior is cooperative.          ASSESSMENT/PLAN:   49 year old female with    1. Folliculitis        I have spent 20 minutes in the care of this patient including review of their past  medical records and diagnostic tests, updating their chart, seeing the patient, discussing current treatment plans and documenting the encounter.    Suspect folliculitis at this time.  No evidence of shingles or herpes.  Can monitor for now since it's so mild.  Can use warm compresses or epsom salt baths   If symptoms show no improvement, consider topical abx treatment  If worsens, RTC  F/u with PCP as previously recommended by PCP    Patient/Caregiver Education: There are no barriers to learning. Medical education done.   Outcome: Patient verbalizes understanding and agrees with plan. Advised to call or RTC if symptoms persist or worsen.    6/4/2024  Sue Veras, DO    Patient understands plan and follow-up.

## 2024-06-18 DIAGNOSIS — I10 BENIGN ESSENTIAL HYPERTENSION: ICD-10-CM

## 2024-06-19 RX ORDER — LISINOPRIL AND HYDROCHLOROTHIAZIDE 20; 12.5 MG/1; MG/1
1 TABLET ORAL DAILY
Qty: 30 TABLET | Refills: 0 | Status: SHIPPED | OUTPATIENT
Start: 2024-06-19

## 2024-06-19 NOTE — TELEPHONE ENCOUNTER
Refill Passed Protocol:     Pt requesting refill of   Requested Prescriptions     Pending Prescriptions Disp Refills    lisinopril-hydroCHLOROthiazide 20-12.5 MG Oral Tab [Pharmacy Med Name: LISINOPRIL-HCTZ 20/12.5MG TABLETS] 30 tablet 0     Sig: TAKE 1 TABLET BY MOUTH DAILY      Refill was approved and sent to pharmacy:   Benign essential hypertension  - BP elevated today (home BP readings also elevated)  - 11/2023 BMP with normal lytes and SCr  -START Lisinopril 10 mg daily, R/B/SE of new med d/w pt  - d/w pt a healthy, low-sodium diet, regular exercise, and wt loss  - RTC in 2 wks or may send in [facility] with BP readings for BP f-u     - lisinopril 10 MG Oral Tab; Take 1 tablet (10 mg total) by mouth daily.  Dispense: 90 tablet; Refill: 1    Last Time Medication was Filled:  4/25/2024    Last Office Visit with Provider: 6/04/2024    No future appointments.

## 2024-08-06 ENCOUNTER — NURSE ONLY (OUTPATIENT)
Dept: FAMILY MEDICINE CLINIC | Facility: CLINIC | Age: 49
End: 2024-08-06
Payer: COMMERCIAL

## 2024-08-06 DIAGNOSIS — R79.89 ABNORMAL TSH: ICD-10-CM

## 2024-08-06 DIAGNOSIS — E78.2 MIXED HYPERLIPIDEMIA: ICD-10-CM

## 2024-08-06 DIAGNOSIS — R73.03 PREDIABETES: ICD-10-CM

## 2024-08-06 LAB
CHOLEST SERPL-MCNC: 191 MG/DL (ref ?–200)
EST. AVERAGE GLUCOSE BLD GHB EST-MCNC: 114 MG/DL (ref 68–126)
FASTING PATIENT LIPID ANSWER: YES
HBA1C MFR BLD: 5.6 % (ref ?–5.7)
HDLC SERPL-MCNC: 47 MG/DL (ref 40–59)
LDLC SERPL CALC-MCNC: 129 MG/DL (ref ?–100)
NONHDLC SERPL-MCNC: 144 MG/DL (ref ?–130)
T4 FREE SERPL-MCNC: 1 NG/DL (ref 0.8–1.7)
TRIGL SERPL-MCNC: 80 MG/DL (ref 30–149)
TSI SER-ACNC: 7.09 MIU/ML (ref 0.55–4.78)
VLDLC SERPL CALC-MCNC: 14 MG/DL (ref 0–30)

## 2024-08-06 PROCEDURE — 84443 ASSAY THYROID STIM HORMONE: CPT | Performed by: FAMILY MEDICINE

## 2024-08-06 PROCEDURE — 83036 HEMOGLOBIN GLYCOSYLATED A1C: CPT | Performed by: FAMILY MEDICINE

## 2024-08-06 PROCEDURE — 84439 ASSAY OF FREE THYROXINE: CPT | Performed by: FAMILY MEDICINE

## 2024-08-06 PROCEDURE — 36415 COLL VENOUS BLD VENIPUNCTURE: CPT | Performed by: FAMILY MEDICINE

## 2024-08-06 PROCEDURE — 80061 LIPID PANEL: CPT | Performed by: FAMILY MEDICINE

## 2024-08-06 NOTE — PROGRESS NOTES
Patient came in for draw of ordered fasting labs. Patient drawn out of left  AC, x 1 attempt and tolerated well.  Light green lavender  tube drawn.

## 2024-08-09 ENCOUNTER — OFFICE VISIT (OUTPATIENT)
Dept: FAMILY MEDICINE CLINIC | Facility: CLINIC | Age: 49
End: 2024-08-09
Payer: COMMERCIAL

## 2024-08-09 VITALS
HEART RATE: 73 BPM | OXYGEN SATURATION: 99 % | RESPIRATION RATE: 18 BRPM | SYSTOLIC BLOOD PRESSURE: 124 MMHG | BODY MASS INDEX: 36 KG/M2 | TEMPERATURE: 97 F | DIASTOLIC BLOOD PRESSURE: 84 MMHG | WEIGHT: 231.63 LBS

## 2024-08-09 DIAGNOSIS — I10 BENIGN ESSENTIAL HYPERTENSION: ICD-10-CM

## 2024-08-09 DIAGNOSIS — E78.2 MIXED HYPERLIPIDEMIA: Primary | ICD-10-CM

## 2024-08-09 DIAGNOSIS — E03.9 HYPOTHYROIDISM, UNSPECIFIED TYPE: ICD-10-CM

## 2024-08-09 DIAGNOSIS — R73.03 PREDIABETES: ICD-10-CM

## 2024-08-09 DIAGNOSIS — Z23 NEED FOR VACCINATION: ICD-10-CM

## 2024-08-09 PROCEDURE — 3079F DIAST BP 80-89 MM HG: CPT | Performed by: FAMILY MEDICINE

## 2024-08-09 PROCEDURE — 90750 HZV VACC RECOMBINANT IM: CPT | Performed by: FAMILY MEDICINE

## 2024-08-09 PROCEDURE — 99214 OFFICE O/P EST MOD 30 MIN: CPT | Performed by: FAMILY MEDICINE

## 2024-08-09 PROCEDURE — 90471 IMMUNIZATION ADMIN: CPT | Performed by: FAMILY MEDICINE

## 2024-08-09 PROCEDURE — 3074F SYST BP LT 130 MM HG: CPT | Performed by: FAMILY MEDICINE

## 2024-08-09 RX ORDER — LEVOTHYROXINE SODIUM 0.03 MG/1
25 TABLET ORAL
Qty: 90 TABLET | Refills: 1 | Status: SHIPPED | OUTPATIENT
Start: 2024-08-09

## 2024-08-09 RX ORDER — LISINOPRIL AND HYDROCHLOROTHIAZIDE 20; 12.5 MG/1; MG/1
1 TABLET ORAL DAILY
Qty: 90 TABLET | Refills: 1 | Status: SHIPPED | OUTPATIENT
Start: 2024-08-09

## 2024-08-09 NOTE — PROGRESS NOTES
CHIEF COMPLAINT:   Chief Complaint   Patient presents with    Medication Follow-Up         HPI:     Emily Pitts is a 49 year old female presents for HTN, HL, and thyroid problem.    Prediabetes: A1c from 8/2024 is now 5.6. She has no problems with polyyria or polydipsia.  She has been walking more and eating better. Lost almost 10 lbs since 5/2024.    Previous HPI from 12/2023: Had elevated FG on 11/2023 labs w/ subsequent A1c of 5.8.  She has no polyuria and polydipsia.      HTN: BP today is stable and tolerating Lisinopril-hydrochlorothiazide well.  She has no CP, no HA, no palpitations, and no leg swelling.    Previous HPI: Pt was noted to have elevated BP at previous visit.  She kept BP log at home over the last few weeks, and readings range 130-150s/80-90s.  She has no HA, no dizziness, no CP, palptiations, no leg swelling. Fhx: mother with HTN.     HL: Was noted on previous 8/2024 labs with improved lipids.       Abnormal TSH: Rpt TSH was abnormal again. She has some fatigue. Fhx: mother and sister with hypothyroidism.     Previous HPI: abnormal TSH on 11/2023 labs. States she is not surprised since her mother has thyroid disease.  She is asymptomatic.                       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    Hypertension Maternal Grandmother         50’s    Heart Attack Maternal Grandmother          2 in her 50’s    Colon Cancer Paternal Grandmother         40’s    Breast Cancer Paternal Grandmother         70’s    Hypertension Paternal Grandmother         Age?    Heart Attack Paternal Grandmother         1 in 70’s, 1 @ 86 ()    Prostate Cancer Paternal Grandfather         Late 70’s    Hypertension Paternal Grandfather         50’s    Stroke Paternal Grandfather          @84    Prostate Cancer Maternal Uncle         Mid 40’s    Heart Attack Maternal Uncle          60    Heart Attack Paternal Uncle          48    Heart Attack Paternal Uncle         Late 60’s    Breast Cancer Other 43        GREAT AUNT      Social History:   Social History     Socioeconomic History    Marital status:    Tobacco Use    Smoking status: Former     Current packs/day: 0.00     Types: Cigarettes     Quit date: 3/31/2004     Years since quittin.3    Smokeless tobacco: Never   Vaping Use    Vaping status: Never Used   Substance and Sexual Activity    Alcohol use: Yes     Comment: Occasional beer    Drug use: Not Currently     Types: Cannabis     Comment: In high school and college        Medications (Active prior to today's visit):  Current Outpatient Medications   Medication Sig Dispense Refill    lisinopril-hydroCHLOROthiazide 20-12.5 MG Oral Tab Take 1 tablet by mouth daily. 90 tablet 1    levothyroxine 25 MCG Oral Tab Take 1 tablet (25 mcg total) by mouth before breakfast. 90 tablet 1    hydrocortisone 2.5 % External Cream Apply 1 Application topically 2 (two) times daily.      desonide 0.05 % External Cream       Vitamin B-12 1000 MCG Oral Tab Take 1 tablet (1,000 mcg total) by mouth daily. 30 tablet 3       Allergies:  Allergies   Allergen Reactions    Penicillins UNKNOWN     Strong family hx of allergy so pt instructed not to take       PSFH elements reviewed from today and agreed except as otherwise stated in HPI.  ROS:     Review of Systems   Constitutional:  Positive for fatigue. Negative for  appetite change and unexpected weight change.   Cardiovascular:  Negative for chest pain, palpitations and leg swelling.   Endocrine: Negative for cold intolerance, heat intolerance, polydipsia and polyuria.   Neurological:  Negative for dizziness and headaches.         Pertinent positives and negatives noted in the the HPI.    PHYSICAL EXAM:   /84 (BP Location: Right arm, Patient Position: Sitting, Cuff Size: large)   Pulse 73   Temp 97.3 °F (36.3 °C) (Temporal)   Resp 18   Wt 231 lb 9.6 oz (105.1 kg)   SpO2 99%   BMI 36.27 kg/m²   Vital signs reviewed.Appears stated age, well groomed.  Physical Exam  Vitals reviewed.   Constitutional:       Appearance: Normal appearance.   HENT:      Head: Normocephalic.   Neck:      Thyroid: No thyroid mass, thyromegaly or thyroid tenderness.   Cardiovascular:      Rate and Rhythm: Normal rate and regular rhythm.      Pulses: Normal pulses.      Heart sounds: Normal heart sounds.   Pulmonary:      Effort: Pulmonary effort is normal. No respiratory distress.      Breath sounds: Normal breath sounds.   Musculoskeletal:      Right lower leg: No edema.      Left lower leg: No edema.   Skin:     General: Skin is warm and dry.   Neurological:      Mental Status: She is alert and oriented to person, place, and time.   Psychiatric:         Behavior: Behavior normal.          LABS     Nurse Only on 08/06/2024   Component Date Value    HgbA1C 08/06/2024 5.6     Estimated Average Glucose 08/06/2024 114     Cholesterol, Total 08/06/2024 191     HDL Cholesterol 08/06/2024 47     Triglycerides 08/06/2024 80     LDL Cholesterol 08/06/2024 129 (H)     VLDL 08/06/2024 14     Non HDL Chol 08/06/2024 144 (H)     Patient Fasting for Lipi* 08/06/2024 Yes     TSH 08/06/2024 7.091 (H)     Free T4 08/06/2024 1.0       REVIEWED THIS VISIT  ASSESSMENT/PLAN:   49 year old female with    1. Mixed hyperlipidemia  - 8/2024 cholesterol is improved  - if still elevated or worse at next check, may  need to start statin therapy  - d/w pt a healthy, low-fat/low-cholesterol diet, regular exercise, and wt loss  - RTC in 4 months for f-u (annual px       2. Prediabetes  - 8/2024 A1c is 5.6 improved  - counseled pt on healthy low carb diet, regular exercise and wt loss  - recheck A1c at annual px appt    3. Benign essential hypertension  - BP stable today  - 11/2023 BMP with normal lytes and SCr  - cont Lisinopril - hydrochlorothiazide   - d/w pt a healthy, low-sodium diet, regular exercise, and wt loss  - RTC in 4 months for annual px and HTN f-u     - lisinopril-hydroCHLOROthiazide 20-12.5 MG Oral Tab; Take 1 tablet by mouth daily.  Dispense: 90 tablet; Refill: 1    4. Hypothyroidism, unspecified type  - new onset, rpt TSH is abnormal  - START L-thyroxine 25 mcg daily, recheck labs in 6-8 wks  - R/B/Se of new med d/w pt    - levothyroxine 25 MCG Oral Tab; Take 1 tablet (25 mcg total) by mouth before breakfast.  Dispense: 90 tablet; Refill: 1  - TSH W Reflex To Free T4 [E]; Future    5. Need for vaccination    - Zoster Recombinant Adjuvanted (Shingrix -Shingles) [[id number]]      Meds This Visit:  Requested Prescriptions     Signed Prescriptions Disp Refills    lisinopril-hydroCHLOROthiazide 20-12.5 MG Oral Tab 90 tablet 1     Sig: Take 1 tablet by mouth daily.    levothyroxine 25 MCG Oral Tab 90 tablet 1     Sig: Take 1 tablet (25 mcg total) by mouth before breakfast.       Health Maintenance:  Health Maintenance   Topic Date Due    COVID-19 Vaccine (3 - 2023-24 season) 09/01/2023    Influenza Vaccine (1) 10/01/2024    Annual Physical  12/04/2024    Mammogram  01/19/2025    Pap Smear  12/04/2028    Colorectal Cancer Screening  06/16/2031    DTaP,Tdap,and Td Vaccines (2 - Td or Tdap) 12/04/2033    Annual Depression Screening  Completed    Zoster Vaccines  Completed    Pneumococcal Vaccine: Birth to 64yrs  Aged Out         Patient/Caregiver Education: There are no barriers to learning. Medical education done.   Outcome:  Patient verbalizes understanding and agrees with plan. Advised to call or RTC if symptoms persist or worsen.    Problem List:     Patient Active Problem List   Diagnosis    Iron deficiency anemia    Obesity (BMI 30-39.9)    Thrombocytopenia (HCC)    Vitamin B12 deficiency    Chondromalacia of patella       Imaging & Referrals:  ZOSTER VACC RECOMBINANT IM [facility]     8/9/2024  Rosemary Bray MD      Patient understands plan and follow-up.  Return in about 4 months (around 12/9/2024) for annual physical, HTN, Cholesterol, hypothyroidism, medication follow-up.

## 2024-10-01 ENCOUNTER — OFFICE VISIT (OUTPATIENT)
Dept: FAMILY MEDICINE CLINIC | Facility: CLINIC | Age: 49
End: 2024-10-01
Payer: COMMERCIAL

## 2024-10-01 VITALS
OXYGEN SATURATION: 98 % | HEART RATE: 63 BPM | BODY MASS INDEX: 35.94 KG/M2 | DIASTOLIC BLOOD PRESSURE: 82 MMHG | HEIGHT: 67 IN | TEMPERATURE: 98 F | WEIGHT: 229 LBS | RESPIRATION RATE: 16 BRPM | SYSTOLIC BLOOD PRESSURE: 120 MMHG

## 2024-10-01 DIAGNOSIS — R39.9 URINARY SYMPTOM OR SIGN: Primary | ICD-10-CM

## 2024-10-01 DIAGNOSIS — R35.0 URINE FREQUENCY: ICD-10-CM

## 2024-10-01 LAB
APPEARANCE: CLEAR
BILIRUBIN: NEGATIVE
GLUCOSE (URINE DIPSTICK): NEGATIVE MG/DL
KETONES (URINE DIPSTICK): NEGATIVE MG/DL
LEUKOCYTES: NEGATIVE
MULTISTIX LOT#: ABNORMAL NUMERIC
NITRITE, URINE: NEGATIVE
OCCULT BLOOD: NEGATIVE
PH, URINE: 8.5 (ref 4.5–8)
PROTEIN (URINE DIPSTICK): NEGATIVE MG/DL
SPECIFIC GRAVITY: 1.02 (ref 1–1.03)
URINE-COLOR: YELLOW
UROBILINOGEN,SEMI-QN: 1 MG/DL (ref 0–1.9)

## 2024-10-01 PROCEDURE — 3074F SYST BP LT 130 MM HG: CPT | Performed by: NURSE PRACTITIONER

## 2024-10-01 PROCEDURE — 87086 URINE CULTURE/COLONY COUNT: CPT | Performed by: NURSE PRACTITIONER

## 2024-10-01 PROCEDURE — 81003 URINALYSIS AUTO W/O SCOPE: CPT | Performed by: NURSE PRACTITIONER

## 2024-10-01 PROCEDURE — 3008F BODY MASS INDEX DOCD: CPT | Performed by: NURSE PRACTITIONER

## 2024-10-01 PROCEDURE — 3079F DIAST BP 80-89 MM HG: CPT | Performed by: NURSE PRACTITIONER

## 2024-10-01 PROCEDURE — 99213 OFFICE O/P EST LOW 20 MIN: CPT | Performed by: NURSE PRACTITIONER

## 2024-10-01 NOTE — PROGRESS NOTES
CHIEF COMPLAINT:     Chief Complaint   Patient presents with    Urinary Symptoms     2 weeks, Frequency, having to go even right after going to bathroom  OTC none       HPI:   Emily Pitts is a 49 year old female who presents with symptoms of UTI. Complaining of urinary frequency for last 2 weeks.  Symptoms have been on and off since onset.  Treatments tried: none. Denies urgency or dysuria.  Associated symptoms: none.   Denies flank pain, fever, hematuria, nausea, or vomiting.  Denies vaginal discharge or itching  Other  hx: no  Hx of pyelonephritis:no  Hx of kidney stone: no    Current Outpatient Medications   Medication Sig Dispense Refill    lisinopril-hydroCHLOROthiazide 20-12.5 MG Oral Tab Take 1 tablet by mouth daily. 90 tablet 1    levothyroxine 25 MCG Oral Tab Take 1 tablet (25 mcg total) by mouth before breakfast. 90 tablet 1    hydrocortisone 2.5 % External Cream Apply 1 Application topically 2 (two) times daily.      desonide 0.05 % External Cream       Vitamin B-12 1000 MCG Oral Tab Take 1 tablet (1,000 mcg total) by mouth daily. 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    Bleeding nose    Just dried blood and tinges of blood when I blow my nose,    Body piercing    Decorative tattoo    Fatigue    Hemorrhoids    During pregnancy    Leaking of urine    Pneumonia due to COVID-19 virus    Skin blushing/flushing    Sleep disturbance      Social History:  Social History     Socioeconomic History    Marital status:    Tobacco Use    Smoking status: Former     Current packs/day: 0.00     Types: Cigarettes     Quit date: 3/31/2004     Years since quittin.5    Smokeless tobacco: Never   Vaping Use    Vaping status: Never Used   Substance and Sexual Activity    Alcohol use: Yes     Comment: Occasional beer    Drug use: Not Currently     Types: Cannabis     Comment: In high school and college         REVIEW OF  SYSTEMS:   GENERAL: Denies fever, chills, or body aches; good appetite  SKIN: no rashes, no skin wounds or ulcers.  EYES:denies blurred vision or double vision  HEENT: no congestion, rhinorrhea, sore throat or ear pain  CARDIOVASCULAR: denies chest pain or palpitations  LUNGS: denies shortness of breath, cough, or wheezing  GI: See HPI. No N/V/C/D.   : See HPI.  NEURO: no headaches.    EXAM:   /82   Pulse 63   Temp 97.6 °F (36.4 °C)   Resp 16   Ht 5' 7\" (1.702 m)   Wt 229 lb (103.9 kg)   SpO2 98%   BMI 35.87 kg/m²   GENERAL: well developed, well nourished,in no apparent distress  CARDIO: RRR, no murmurs  LUNGS: clear to ausculation bilaterally, no wheezing or rhonchi  GI: BS present x 4.  No hepatosplenomegaly, no tenderness  : no suprapubic tenderness, bladder distention, or CVAT   EXTREMITIES: no edema    Recent Results (from the past 24 hour(s))   URINALYSIS, AUTO, W/O SCOPE    Collection Time: 10/01/24 12:21 PM   Result Value Ref Range    Glucose Urine Negative Negative mg/dL    Bilirubin Urine Negative Negative    Ketones, UA Negative Negative - Trace mg/dL    Spec Gravity 1.020 1.005 - 1.030    Blood Urine Negative Negative    PH Urine 8.5 (A) 5.0 - 8.0    Protein Urine Negative Negative - Trace mg/dL    Urobilinogen Urine 1.0 0.2 - 1.0 mg/dL    Nitrite Urine Negative Negative    Leukocyte Esterase Urine Negative Negative    APPEARANCE clear Clear    Color Urine yellow Yellow    Multistix Lot# [id number] Numeric    Multistix Expiration Date 5-21-25 Date         ASSESSMENT AND PLAN:   Emily Pitts is a 49 year old female presents with UTI symptoms.    ASSESSMENT:  Encounter Diagnoses   Name Primary?    Urinary symptom or sign Yes    Urine frequency      1. Urinary symptom or sign  - URINALYSIS, AUTO, W/O SCOPE  - Urine Culture, Routine; Future  - Urine Culture, Routine    2. Urine frequency      UA negative. Send for culture. If positive start antibiotic. If negative f/u with PCP consider  pelvic floor therapy.     PLAN: Meds as listed below.  Urine sent for culture. Comfort measures as described in Patient Instructions    Meds & Refills for this Visit:  Requested Prescriptions      No prescriptions requested or ordered in this encounter       Risk and benefits of medication discussed.     See pt handout      The patient indicates understanding of these issues and agrees to the plan.  The patient is asked to follow up with PCP in 3-5 days if not better. Call if fever, vomiting, worsening symptoms.

## 2024-10-18 ENCOUNTER — OFFICE VISIT (OUTPATIENT)
Dept: FAMILY MEDICINE CLINIC | Facility: CLINIC | Age: 49
End: 2024-10-18
Payer: COMMERCIAL

## 2024-10-18 VITALS
HEART RATE: 82 BPM | BODY MASS INDEX: 36 KG/M2 | RESPIRATION RATE: 18 BRPM | SYSTOLIC BLOOD PRESSURE: 112 MMHG | WEIGHT: 227.81 LBS | DIASTOLIC BLOOD PRESSURE: 76 MMHG | TEMPERATURE: 98 F | OXYGEN SATURATION: 97 %

## 2024-10-18 DIAGNOSIS — E03.9 HYPOTHYROIDISM, UNSPECIFIED TYPE: Primary | ICD-10-CM

## 2024-10-18 DIAGNOSIS — L73.9 FOLLICULITIS: ICD-10-CM

## 2024-10-18 DIAGNOSIS — Z23 NEED FOR VACCINATION: ICD-10-CM

## 2024-10-18 DIAGNOSIS — B00.9 HSV (HERPES SIMPLEX VIRUS) INFECTION: ICD-10-CM

## 2024-10-18 LAB — TSI SER-ACNC: 3.68 MIU/ML (ref 0.55–4.78)

## 2024-10-18 PROCEDURE — 3074F SYST BP LT 130 MM HG: CPT | Performed by: FAMILY MEDICINE

## 2024-10-18 PROCEDURE — 84443 ASSAY THYROID STIM HORMONE: CPT | Performed by: FAMILY MEDICINE

## 2024-10-18 PROCEDURE — 99214 OFFICE O/P EST MOD 30 MIN: CPT | Performed by: FAMILY MEDICINE

## 2024-10-18 PROCEDURE — 3078F DIAST BP <80 MM HG: CPT | Performed by: FAMILY MEDICINE

## 2024-10-18 PROCEDURE — 90656 IIV3 VACC NO PRSV 0.5 ML IM: CPT | Performed by: FAMILY MEDICINE

## 2024-10-18 PROCEDURE — 90471 IMMUNIZATION ADMIN: CPT | Performed by: FAMILY MEDICINE

## 2024-10-18 PROCEDURE — 36415 COLL VENOUS BLD VENIPUNCTURE: CPT | Performed by: FAMILY MEDICINE

## 2024-10-18 RX ORDER — VALACYCLOVIR HYDROCHLORIDE 1 G/1
1000 TABLET, FILM COATED ORAL DAILY
Qty: 90 TABLET | Refills: 1 | Status: SHIPPED | OUTPATIENT
Start: 2024-10-18

## 2024-10-18 NOTE — PROGRESS NOTES
Patient came in for draw of ordered fasting labs. Patient drawn out of left  AC, x 1 attempt and tolerated well.  Light green  tube drawn.

## 2024-10-18 NOTE — PROGRESS NOTES
CHIEF COMPLAINT:   Chief Complaint   Patient presents with    Other     folliculitis          HPI:     Emily Pitts is a 49 year old female presents for skin problem.    H/o folliculitis:  pt has recurrent folliculitis of her bikini area- does not shave in the area, but occasionally gets bumps along the groin.  The bumps are not painful, no redness, no drainage.  She states she worries sometimes that it might be HSV, since she was dx'd with HSV that is recurrent on the upper portions of her buttocks. Occurs a few times a year.  Has had this since age 18.  She is requesting prophylactic medication for this.    Hypothyroidism: pt was noted on previous labs to have abnormal TSH and rpt TSH.  She was started on Levothyroxine 25 mcg in 2024 and is due for rpt TSH today. She feels well- no fatigue, no cold intolerance, no dry skin, or hair falling out/hair thinning.              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    Hypertension Maternal Grandmother         50’s    Heart Attack Maternal Grandmother         2 in her 50’s    Colon Cancer Paternal Grandmother         40’s    Breast Cancer Paternal Grandmother         70’s    Hypertension Paternal Grandmother         Age?    Heart Attack Paternal Grandmother         1 in 70’s, 1 @ 86 ()    Prostate Cancer Paternal Grandfather         Late 70’s     Hypertension Paternal Grandfather         50’s    Stroke Paternal Grandfather          @84    Prostate Cancer Maternal Uncle         Mid 40’s    Heart Attack Maternal Uncle          60    Heart Attack Paternal Uncle          48    Heart Attack Paternal Uncle         Late 60’s    Breast Cancer Other 43        GREAT AUNT      Social History:   Social History     Socioeconomic History    Marital status:    Tobacco Use    Smoking status: Former     Current packs/day: 0.00     Types: Cigarettes     Quit date: 3/31/2004     Years since quittin.5    Smokeless tobacco: Never   Vaping Use    Vaping status: Never Used   Substance and Sexual Activity    Alcohol use: Yes     Comment: Occasional beer    Drug use: Not Currently     Types: Cannabis     Comment: In high school and college        Medications (Active prior to today's visit):  Current Outpatient Medications   Medication Sig Dispense Refill    valACYclovir 1 G Oral Tab Take 1 tablet (1,000 mg total) by mouth daily. 90 tablet 1    lisinopril-hydroCHLOROthiazide 20-12.5 MG Oral Tab Take 1 tablet by mouth daily. 90 tablet 1    levothyroxine 25 MCG Oral Tab Take 1 tablet (25 mcg total) by mouth before breakfast. 90 tablet 1    hydrocortisone 2.5 % External Cream Apply 1 Application topically 2 (two) times daily.      desonide 0.05 % External Cream       Vitamin B-12 1000 MCG Oral Tab Take 1 tablet (1,000 mcg total) by mouth daily. 30 tablet 3       Allergies:  Allergies[1]    PSFH elements reviewed from today and agreed except as otherwise stated in HPI.  ROS:     Review of Systems   Constitutional:  Negative for appetite change, chills, fatigue and fever.   Gastrointestinal:  Negative for constipation.   Endocrine: Negative for cold intolerance.   Skin:  Negative for color change and rash.        No dry skin, no hair thinning.   Folliculitis of bikini area         Pertinent positives and negatives noted in the the HPI.    PHYSICAL EXAM:   /76  (BP Location: Left arm, Patient Position: Sitting, Cuff Size: adult)   Pulse 82   Temp 97.5 °F (36.4 °C) (Temporal)   Resp 18   Wt 227 lb 12.8 oz (103.3 kg)   SpO2 97%   BMI 35.68 kg/m²   Vital signs reviewed.Appears stated age, well groomed.  Physical Exam  Vitals reviewed.          LABS     Office Visit on 10/01/2024   Component Date Value    Glucose Urine 10/01/2024 Negative     Bilirubin Urine 10/01/2024 Negative     Ketones, UA 10/01/2024 Negative     Spec Gravity 10/01/2024 1.020     Blood Urine 10/01/2024 Negative     PH Urine 10/01/2024 8.5 (A)     Protein Urine 10/01/2024 Negative     Urobilinogen Urine 10/01/2024 1.0     Nitrite Urine 10/01/2024 Negative     Leukocyte Esterase Urine 10/01/2024 Negative     APPEARANCE 10/01/2024 clear     Color Urine 10/01/2024 yellow     Multistix Lot# 10/01/2024 [id number]     Multistix Expiration Date 10/01/2024 5-21-25     Urine Culture 10/01/2024 10-50,000 cfu/ml Multiple species present-probable contamination.       REVIEWED THIS VISIT  ASSESSMENT/PLAN:   49 year old female with    1. Hypothyroidism, unspecified type  - started Levothyroxine 25 mcg daily in 8/2024  - recheck TSH today, await results    - TSH W Reflex To Free T4 [E]    2. Folliculitis  - suspect has resolved, since has no skin lesions, or skin inflammation on exam  - recommend BodyGlide to reduce chafing.  Advise to wear loose clothing when at home and when sleeping    3. HSV (herpes simplex virus) infection  - affects upper buttocks  - pt requesting ppx medication: start Valacylovir 1gm daily, R/B/SE of new med d/w pt    - valACYclovir 1 G Oral Tab; Take 1 tablet (1,000 mg total) by mouth daily.  Dispense: 90 tablet; Refill: 1    4. Need for vaccination    - INFLUENZA VACCINE, TRI, PRESERV FREE, 0.5 ML      Meds This Visit:  Requested Prescriptions     Signed Prescriptions Disp Refills    valACYclovir 1 G Oral Tab 90 tablet 1     Sig: Take 1 tablet (1,000 mg total) by mouth daily.       Health  Maintenance:  Health Maintenance   Topic Date Due    COVID-19 Vaccine (3 - 2023-24 season) 09/01/2024    Annual Physical  12/04/2024    Mammogram  01/19/2025    Pap Smear  12/04/2028    Colorectal Cancer Screening  06/16/2031    DTaP,Tdap,and Td Vaccines (2 - Td or Tdap) 12/04/2033    Influenza Vaccine  Completed    Annual Depression Screening  Completed    Zoster Vaccines  Completed    Pneumococcal Vaccine: Birth to 64yrs  Aged Out         Patient/Caregiver Education: There are no barriers to learning. Medical education done.   Outcome: Patient verbalizes understanding and agrees with plan. Advised to call or RTC if symptoms persist or worsen.    Problem List:     Patient Active Problem List   Diagnosis    Iron deficiency anemia    Obesity (BMI 30-39.9)    Thrombocytopenia (HCC)    Vitamin B12 deficiency    Chondromalacia of patella    Hypothyroidism    Benign essential hypertension    Prediabetes    Mixed hyperlipidemia       Imaging & Referrals:  INFLUENZA VACCINE, TRI, PRESERV FREE, 0.5 ML     10/18/2024  Rosemary Bray MD      Patient understands plan and follow-up.  Return for annual physical in Dec 2024.              [1]   Allergies  Allergen Reactions    Penicillins UNKNOWN     Strong family hx of allergy so pt instructed not to take

## 2024-10-19 DIAGNOSIS — E78.2 MIXED HYPERLIPIDEMIA: ICD-10-CM

## 2024-10-19 DIAGNOSIS — R73.03 PREDIABETES: ICD-10-CM

## 2024-10-19 DIAGNOSIS — I10 BENIGN ESSENTIAL HYPERTENSION: ICD-10-CM

## 2024-10-19 DIAGNOSIS — E03.9 HYPOTHYROIDISM, UNSPECIFIED TYPE: Primary | ICD-10-CM

## 2025-02-28 ENCOUNTER — OFFICE VISIT (OUTPATIENT)
Dept: FAMILY MEDICINE CLINIC | Facility: CLINIC | Age: 50
End: 2025-02-28
Payer: COMMERCIAL

## 2025-02-28 VITALS
BODY MASS INDEX: 36.79 KG/M2 | HEIGHT: 67 IN | OXYGEN SATURATION: 97 % | WEIGHT: 234.38 LBS | DIASTOLIC BLOOD PRESSURE: 80 MMHG | SYSTOLIC BLOOD PRESSURE: 130 MMHG | HEART RATE: 74 BPM | TEMPERATURE: 98 F | RESPIRATION RATE: 18 BRPM

## 2025-02-28 DIAGNOSIS — I10 BENIGN ESSENTIAL HYPERTENSION: ICD-10-CM

## 2025-02-28 DIAGNOSIS — E03.9 HYPOTHYROIDISM, UNSPECIFIED TYPE: ICD-10-CM

## 2025-02-28 DIAGNOSIS — Z13.0 SCREENING FOR ENDOCRINE, NUTRITIONAL, METABOLIC AND IMMUNITY DISORDER: ICD-10-CM

## 2025-02-28 DIAGNOSIS — B00.9 HSV (HERPES SIMPLEX VIRUS) INFECTION: ICD-10-CM

## 2025-02-28 DIAGNOSIS — R00.2 FLUTTERING SENSATION OF HEART: ICD-10-CM

## 2025-02-28 DIAGNOSIS — R73.03 PREDIABETES: ICD-10-CM

## 2025-02-28 DIAGNOSIS — Z00.00 ANNUAL PHYSICAL EXAM: Primary | ICD-10-CM

## 2025-02-28 DIAGNOSIS — Z13.29 SCREENING FOR ENDOCRINE, NUTRITIONAL, METABOLIC AND IMMUNITY DISORDER: ICD-10-CM

## 2025-02-28 DIAGNOSIS — Z12.31 ENCOUNTER FOR SCREENING MAMMOGRAM FOR MALIGNANT NEOPLASM OF BREAST: ICD-10-CM

## 2025-02-28 DIAGNOSIS — Z13.228 SCREENING FOR ENDOCRINE, NUTRITIONAL, METABOLIC AND IMMUNITY DISORDER: ICD-10-CM

## 2025-02-28 DIAGNOSIS — Z13.21 SCREENING FOR ENDOCRINE, NUTRITIONAL, METABOLIC AND IMMUNITY DISORDER: ICD-10-CM

## 2025-02-28 DIAGNOSIS — E53.8 VITAMIN B12 DEFICIENCY: ICD-10-CM

## 2025-02-28 DIAGNOSIS — E78.2 MIXED HYPERLIPIDEMIA: ICD-10-CM

## 2025-02-28 PROCEDURE — 3075F SYST BP GE 130 - 139MM HG: CPT | Performed by: FAMILY MEDICINE

## 2025-02-28 PROCEDURE — 93000 ELECTROCARDIOGRAM COMPLETE: CPT | Performed by: FAMILY MEDICINE

## 2025-02-28 PROCEDURE — 3079F DIAST BP 80-89 MM HG: CPT | Performed by: FAMILY MEDICINE

## 2025-02-28 PROCEDURE — 99396 PREV VISIT EST AGE 40-64: CPT | Performed by: FAMILY MEDICINE

## 2025-02-28 PROCEDURE — 3008F BODY MASS INDEX DOCD: CPT | Performed by: FAMILY MEDICINE

## 2025-02-28 RX ORDER — VALACYCLOVIR HYDROCHLORIDE 1 G/1
1000 TABLET, FILM COATED ORAL DAILY
Qty: 90 TABLET | Refills: 1 | Status: SHIPPED | OUTPATIENT
Start: 2025-02-28

## 2025-02-28 RX ORDER — LISINOPRIL AND HYDROCHLOROTHIAZIDE 12.5; 2 MG/1; MG/1
1 TABLET ORAL DAILY
Qty: 90 TABLET | Refills: 1 | Status: SHIPPED | OUTPATIENT
Start: 2025-02-28

## 2025-02-28 RX ORDER — LEVOTHYROXINE SODIUM 25 UG/1
25 TABLET ORAL
Qty: 90 TABLET | Refills: 1 | Status: SHIPPED | OUTPATIENT
Start: 2025-02-28

## 2025-03-04 LAB
ATRIAL RATE: 62 BPM
P AXIS: 66 DEGREES
P-R INTERVAL: 168 MS
Q-T INTERVAL: 398 MS
QRS DURATION: 94 MS
QTC CALCULATION (BEZET): 403 MS
R AXIS: 21 DEGREES
T AXIS: 45 DEGREES
VENTRICULAR RATE: 62 BPM

## 2025-03-04 NOTE — PROGRESS NOTES
Chief Complaint   Patient presents with    Physical     Annual exam w/o pap        HPI:   Emily Pitts is a 49 year old female who presents for a complete physical without gyne exam.   Patient feels well, dental visit up to date, no hearing problem.  Vaccinations up to date    LMP: menopause  Sexual activity:monogamy   Contraception: menopause  Exercise: walking, occasional.  Diet:  balanced    Wt Readings from Last 3 Encounters:   25 234 lb 6.4 oz (106.3 kg)   10/18/24 227 lb 12.8 oz (103.3 kg)   10/01/24 229 lb (103.9 kg)      BP Readings from Last 3 Encounters:   25 130/80   10/18/24 112/76   10/01/24 120/82     No LMP recorded. (Menstrual status: Menopause).     Annual physical:  Overall pt states she feels well.     LMP: menopause  Sexually Active: monogamous  Last pap smear: 2023 normal pap and HPV neg (rpt in 5 yrs)  Last mammogram: overdue, ordered  Last Colon Ca screenin2021 colonoscopy normal (rpt in 10 yrs)  Last DEXA Scan: n/a    Exercise: walking  Diet: balanced    Prediabetes: A1c from 2024 was 5.6, is due to recheck. She has no problems with polyuria or polydipsia.  She has been walking more and eating better.      Previous HPI from 2023: Had elevated FG on 2023 labs w/ subsequent A1c of 5.8.  She has no polyuria and polydipsia.      HTN: BP today is stable and tolerating Lisinopril-hydrochlorothiazide well.  She has no CP, no HA, no palpitations, and no leg swelling.     Previous HPI: Pt was noted to have elevated BP at previous visit.  She kept BP log at home over the last few weeks, and readings range 130-150s/80-90s.  She has no HA, no dizziness, no CP, palptiations, no leg swelling. Fhx: mother with HTN.     HL: Was noted on previous 2024 labs with improved lipids.  Diet controlled.    Hypothyroidism: is doing well on Levothyroxine 25 mcg.  Has no fatigue, no constipation, no cold intolerance, or hair falling out.    Previous HPI: pt was noted on previous labs  to have abnormal TSH and rpt TSH.  She was started on Levothyroxine 25 mcg in 8/2024 and is due for rpt TSH today. She feels well- no fatigue, no cold intolerance, no dry skin, or hair falling out/hair thinning.     Previous HPI: Rpt TSH was abnormal again. She has some fatigue. Fhx: mother and sister with hypothyroidism.      Previous HPI: abnormal TSH on 11/2023 labs. States she is not surprised since her mother has thyroid disease.  She is asymptomatic    Heart problem: pt reports over the last 6 months, has had a sensation of a \"heart flutter\" about once per month, lasting 5-10 seconds, resolving on its own after some deep breathing.  This comes on during rest or with activity, is random.  She has no pain. Last occurrence was last week. Her family is worried about her and wanted to bring it up to her PCP.          Current Outpatient Medications   Medication Sig Dispense Refill    levothyroxine 25 MCG Oral Tab Take 1 tablet (25 mcg total) by mouth before breakfast. 90 tablet 1    lisinopril-hydroCHLOROthiazide 20-12.5 MG Oral Tab Take 1 tablet by mouth daily. 90 tablet 1    valACYclovir 1 G Oral Tab Take 1 tablet (1,000 mg total) by mouth daily. 90 tablet 1    hydrocortisone 2.5 % External Cream Apply 1 Application topically 2 (two) times daily.      desonide 0.05 % External Cream       Vitamin B-12 1000 MCG Oral Tab Take 1 tablet (1,000 mcg total) by mouth daily. 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    Hypertension Maternal Grandmother         50’s    Heart Attack Maternal Grandmother         2 in her 50’s    Colon Cancer Paternal Grandmother         40’s    Breast Cancer Paternal Grandmother         70’s    Hypertension Paternal Grandmother         Age?    Heart Attack Paternal Grandmother         1 in 70’s, 1 @ 86 ()    Prostate Cancer Paternal Grandfather         Late 70’s    Hypertension Paternal Grandfather         50’s    Stroke Paternal Grandfather          @84    Prostate Cancer Maternal Uncle         Mid 40’s    Heart Attack Maternal Uncle          60    Heart Attack Paternal Uncle          48    Heart Attack Paternal Uncle         Late 60’s    Breast Cancer Other 43        GREAT AUNT      Social History:  Social History     Socioeconomic History    Marital status:    Tobacco Use    Smoking status: Former     Current packs/day: 0.00     Types: Cigarettes     Quit date: 3/31/2004     Years since quittin.9    Smokeless tobacco: Never   Vaping Use    Vaping status: Never Used   Substance and Sexual Activity    Alcohol use: Yes     Comment: Occasional beer    Drug use: Not Currently     Types: Cannabis     Comment: In high school and college       Allergies:  Allergies[1]     REVIEW OF SYSTEMS:     Review of Systems   Constitutional:  Negative for appetite change, fatigue and unexpected weight change.   Cardiovascular:  Positive for palpitations. Negative for chest pain and leg swelling.   Gastrointestinal:  Negative for abdominal pain, constipation, diarrhea and vomiting.   Endocrine: Negative for cold intolerance, heat intolerance, polydipsia and polyuria.   Genitourinary:  Negative for dysuria.   Neurological:  Negative for dizziness and headaches.        EXAM:   /80 (BP Location: Right arm, Patient Position: Sitting, Cuff Size: large)   Pulse 74   Temp 97.5 °F (36.4  °C) (Temporal)   Resp 18   Ht 5' 7\" (1.702 m)   Wt 234 lb 6.4 oz (106.3 kg)   SpO2 97%   BMI 36.71 kg/m²    GENERAL: WD/WN in no acute distress.   HEENT: PERRLA and EOMI.  OP moist no lesions.TM WNL, kaiser.Normal ears canals bilaterally.  Neck is supple, with no cervical LAD or thyroid abnormalities.  LUNGS: are clear to auscultation bilaterally, with no wheeze, rhonchi, or rales.   HEART: is RRR.  S1, S2, with no murmurs,clicks, gallops  BREAST:deferred  ABDOMEN: is soft,NBS, NT/ND with no HSM.  No rebound or guarding. No CVA tenderness, no hernias.   EXAM: deferred  RECTAL EXAM: deferred  NEURO: Cranial nerves II-XII normal,no focal abnormalities, and reflexes coordination and gait normal and symmetric.Sensation intact.  EXTREMETIES: are symmetric with no cyanosis, clubbing, or edema.  MS: Normal muscles tones, no joints abnormalities.  SKIN: Normal color, turgor, no lesions, rashes or wounds.  PSYCH: normal affect and mood.    ASSESSMENT AND PLAN:     49 year old female with     1. Annual physical exam  Routine labs ordered today, await results. Counseled pt on healthy lifestyle changes. Vaccines today: UTD . Contraception: menopause .    Last pap smear: 2023 normal pap and HPV neg (rpt in 5 yrs)  Last mammogram: overdue, ordered  Last Colon Ca screenin2021 colonoscopy normal (rpt in 10 yrs)  Last DEXA Scan: n/a       - CBC With Differential With Platelet; Future    2. Mixed hyperlipidemia  - / cholesterol is improved  - if still elevated or worse at next check, may need to start statin therapy  - d/w pt a healthy, low-fat/low-cholesterol diet, regular exercise, and wt loss  - RTC in 4 months for f-u (annual px          3. Hypothyroidism, unspecified type  - cont L-thyroxine 25 mcg  - due to recheck TSH    - levothyroxine 25 MCG Oral Tab; Take 1 tablet (25 mcg total) by mouth before breakfast.  Dispense: 90 tablet; Refill: 1    4. Benign essential hypertension  - BP stable today  - 2023 BMP  with normal lytes and SCr  - cont Lisinopril - hydrochlorothiazide   - d/w pt a healthy, low-sodium diet, regular exercise, and wt loss  - RTC in 4 months for annual px and HTN f-u     - lisinopril-hydroCHLOROthiazide 20-12.5 MG Oral Tab; Take 1 tablet by mouth daily.  Dispense: 90 tablet; Refill: 1    5. Prediabetes  - 8/2024 A1c was 5.6, is due to recheck  - counseled pt on healthy low carb diet, regular exercise and wt loss       6. HSV (herpes simplex virus) infection  - pt needs refill of Valacyclovir    - valACYclovir 1 G Oral Tab; Take 1 tablet (1,000 mg total) by mouth daily.  Dispense: 90 tablet; Refill: 1    7. Vitamin B12 deficiency  - due to check Vit B12 level  - Vitamin B12 [E]; Future    8. Fluttering sensation of heart  - EKG with NSR, no acute changes  - check labs  - if pt has persistent episode, then needs to go to the ED    - EKG with interpretation and Report -IN OFFICE [[id number]]    9. Encounter for screening mammogram for malignant neoplasm of breast    - [facility] ALVIN 2D+3D SCREENING BILAT (CPT=77067/75221); Future    10. Screening for endocrine, nutritional, metabolic and immunity disorder    - CBC With Differential With Platelet; Future        Pt's was recommended low fat diet and aerobic exercise 30 minutes three times weekly.   Health maintenance.   Osteoporosis prevention addressed  Recommended whole food type diet, eliminate processed food/low sugar and low sat fat diet      The patient indicates understanding of these issues and agrees to the plan.    Return in about 6 months (around 8/28/2025) for Cholesterol, prediabetes, hypothyroidism, medication follow-up.         [1]   Allergies  Allergen Reactions    Penicillins UNKNOWN     Strong family hx of allergy so pt instructed not to take

## 2025-03-14 ENCOUNTER — HOSPITAL ENCOUNTER (OUTPATIENT)
Dept: MAMMOGRAPHY | Age: 50
Discharge: HOME | End: 2025-03-14
Attending: FAMILY MEDICINE
Payer: COMMERCIAL

## 2025-03-14 DIAGNOSIS — Z12.31 ENCOUNTER FOR SCREENING MAMMOGRAM FOR MALIGNANT NEOPLASM OF BREAST: ICD-10-CM

## 2025-03-14 PROCEDURE — 77067 SCR MAMMO BI INCL CAD: CPT | Performed by: FAMILY MEDICINE

## 2025-03-14 PROCEDURE — 77063 BREAST TOMOSYNTHESIS BI: CPT | Performed by: FAMILY MEDICINE

## 2025-07-01 DIAGNOSIS — B00.9 HSV (HERPES SIMPLEX VIRUS) INFECTION: ICD-10-CM

## 2025-07-01 RX ORDER — VALACYCLOVIR HYDROCHLORIDE 1 G/1
1000 TABLET, FILM COATED ORAL DAILY
Qty: 90 TABLET | Refills: 3 | Status: SHIPPED | OUTPATIENT
Start: 2025-07-01

## 2025-07-02 NOTE — TELEPHONE ENCOUNTER
Refill passed per [facility] protocols.    Requested Prescriptions   Pending Prescriptions Disp Refills    valACYclovir 1 G Oral Tab 90 tablet 3     Sig: Take 1 tablet (1,000 mg total) by mouth daily.       Herpes Agent Protocol Passed - 7/1/2025  8:13 PM

## 2025-07-03 ENCOUNTER — OFFICE VISIT (OUTPATIENT)
Dept: FAMILY MEDICINE CLINIC | Facility: CLINIC | Age: 50
End: 2025-07-03
Payer: COMMERCIAL

## 2025-07-03 DIAGNOSIS — H61.22 IMPACTED CERUMEN OF LEFT EAR: Primary | ICD-10-CM

## 2025-07-03 DIAGNOSIS — H61.21 EXCESSIVE CERUMEN IN EAR CANAL, RIGHT: ICD-10-CM

## 2025-07-03 PROCEDURE — 99213 OFFICE O/P EST LOW 20 MIN: CPT | Performed by: NURSE PRACTITIONER

## 2025-07-04 NOTE — PROGRESS NOTES
CHIEF COMPLAINT:     Chief Complaint   Patient presents with    Ear Problem     L ear feels full/clogged x 4 days, OTC debrox ear drops       HPI:   Emily Pitts is a 50 year old female who presents to clinic today with complaints of  Left ear being clogged/full x4 days. Has used otc debrxo drops with out improvement. In March had irrigation and some removal, but not full, was doing ok until past 4 days.  NO URI symptoms. Right ear feels normal.   Current Medications[1]   Past Medical History[2]   Social History:  Short Social Hx on File[3]     REVIEW OF SYSTEMS:   GENERAL: Feeling well otherwise.  No chills.  No unexpected weight change.  SKIN: no unusual skin lesions or rashes  HEENT: See HPI  LUNGS: No cough, shortness of breath, or wheezing.  CARDIOVASCULAR: No chest pain, palpitations  GI: No N/V/C/D.  NEURO: denies headaches or dizziness    EXAM:   There were no vitals taken for this visit.  GENERAL: well developed, well nourished,in no apparent distress  SKIN: no rashes,no suspicious lesions  HEAD: atraumatic, normocephalic  EYES: conjunctiva clear, EOM intact  EARS: Tragus non tender on palpation bilaterally. Right canal with large amount of cerumen dry, hard, only small amount of TM visualized, appears clear.  Left TM with cerumen impaction. TM not visualized.   NOSE: nostrils patent, no exudates, nasal mucosa pink and noninflamed      ASSESSMENT AND PLAN:   Emily Pitts is a 50 year old female who presents with:    ASSESSMENT:  Encounter Diagnoses   Name Primary?    Impacted cerumen of left ear Yes    Excessive cerumen in ear canal, right        PLAN:     Cerumen Removal Procedure  Patient gave verbal consent.  Risks and Benefits of removal were discussed with the patient, who agreed to proceed with procedure.  Left Ear  Indication TM not visible, local itching, fullness.  Prep Hydrogen Peroxide, flushed.  Patient Status Tolerated Well  No complications   Left EAC and TM clear after  irrigation.       Meds as listed below. comfort measures as described in Patient Instructions.      Pt. To Use Debrox in R ear, return if becomes clogged.     Meds & Refills for this Visit:        Patient voiced understand and is in agreement with treatment plan.        [1]   Current Outpatient Medications   Medication Sig Dispense Refill    valACYclovir 1 G Oral Tab Take 1 tablet (1,000 mg total) by mouth daily. 90 tablet 3    levothyroxine 25 MCG Oral Tab Take 1 tablet (25 mcg total) by mouth before breakfast. 90 tablet 1    lisinopril-hydroCHLOROthiazide 20-12.5 MG Oral Tab Take 1 tablet by mouth daily. 90 tablet 1    hydrocortisone 2.5 % External Cream Apply 1 Application topically 2 (two) times daily.      desonide 0.05 % External Cream       Vitamin B-12 1000 MCG Oral Tab Take 1 tablet (1,000 mcg total) by mouth daily. 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    Bleeding nose    Just dried blood and tinges of blood when I blow my nose,    Body piercing    Decorative tattoo    Fatigue    Hemorrhoids    During pregnancy    Leaking of urine    Pneumonia due to COVID-19 virus    Skin blushing/flushing    Sleep disturbance   [3]   Social History  Socioeconomic History    Marital status:    Tobacco Use    Smoking status: Former     Current packs/day: 0.00     Types: Cigarettes     Quit date: 3/31/2004     Years since quittin.2    Smokeless tobacco: Never   Vaping Use    Vaping status: Never Used   Substance and Sexual Activity    Alcohol use: Yes     Comment: Occasional beer    Drug use: Not Currently     Types: Cannabis     Comment: In high school and college

## 2025-08-11 ENCOUNTER — NURSE ONLY (OUTPATIENT)
Dept: FAMILY MEDICINE CLINIC | Facility: CLINIC | Age: 50
End: 2025-08-11

## 2025-08-11 DIAGNOSIS — Z00.00 ANNUAL PHYSICAL EXAM: ICD-10-CM

## 2025-08-11 DIAGNOSIS — E53.8 VITAMIN B12 DEFICIENCY: ICD-10-CM

## 2025-08-11 DIAGNOSIS — E78.2 MIXED HYPERLIPIDEMIA: ICD-10-CM

## 2025-08-11 DIAGNOSIS — Z13.228 SCREENING FOR ENDOCRINE, NUTRITIONAL, METABOLIC AND IMMUNITY DISORDER: ICD-10-CM

## 2025-08-11 DIAGNOSIS — Z13.0 SCREENING FOR ENDOCRINE, NUTRITIONAL, METABOLIC AND IMMUNITY DISORDER: ICD-10-CM

## 2025-08-11 DIAGNOSIS — E03.9 HYPOTHYROIDISM, UNSPECIFIED TYPE: ICD-10-CM

## 2025-08-11 DIAGNOSIS — Z13.29 SCREENING FOR ENDOCRINE, NUTRITIONAL, METABOLIC AND IMMUNITY DISORDER: ICD-10-CM

## 2025-08-11 DIAGNOSIS — I10 BENIGN ESSENTIAL HYPERTENSION: ICD-10-CM

## 2025-08-11 DIAGNOSIS — R73.03 PREDIABETES: ICD-10-CM

## 2025-08-11 DIAGNOSIS — Z13.21 SCREENING FOR ENDOCRINE, NUTRITIONAL, METABOLIC AND IMMUNITY DISORDER: ICD-10-CM

## 2025-08-11 LAB
ALBUMIN SERPL-MCNC: 4.6 G/DL (ref 3.2–4.8)
ALBUMIN/GLOB SERPL: 1.5 (ref 1–2)
ALP LIVER SERPL-CCNC: 62 U/L (ref 41–108)
ALT SERPL-CCNC: 31 U/L (ref 10–49)
ANION GAP SERPL CALC-SCNC: 8 MMOL/L (ref 0–18)
AST SERPL-CCNC: 23 U/L (ref ?–34)
BASOPHILS # BLD AUTO: 0.02 X10(3) UL (ref 0–0.2)
BASOPHILS NFR BLD AUTO: 0.4 %
BILIRUB SERPL-MCNC: 1.8 MG/DL (ref 0.3–1.2)
BUN BLD-MCNC: 14 MG/DL (ref 9–23)
CALCIUM BLD-MCNC: 9.4 MG/DL (ref 8.7–10.6)
CHLORIDE SERPL-SCNC: 104 MMOL/L (ref 98–112)
CHOLEST SERPL-MCNC: 198 MG/DL (ref ?–200)
CO2 SERPL-SCNC: 28 MMOL/L (ref 21–32)
CREAT BLD-MCNC: 0.93 MG/DL (ref 0.55–1.02)
EGFRCR SERPLBLD CKD-EPI 2021: 74 ML/MIN/1.73M2 (ref 60–?)
EOSINOPHIL # BLD AUTO: 0.41 X10(3) UL (ref 0–0.7)
EOSINOPHIL NFR BLD AUTO: 7.3 %
ERYTHROCYTE [DISTWIDTH] IN BLOOD BY AUTOMATED COUNT: 13.2 %
EST. AVERAGE GLUCOSE BLD GHB EST-MCNC: 111 MG/DL (ref 68–126)
FASTING PATIENT LIPID ANSWER: NO
FASTING STATUS PATIENT QL REPORTED: NO
GLOBULIN PLAS-MCNC: 3.1 G/DL (ref 2–3.5)
GLUCOSE BLD-MCNC: 109 MG/DL (ref 70–99)
HBA1C MFR BLD: 5.5 % (ref ?–5.7)
HCT VFR BLD AUTO: 42.5 % (ref 35–48)
HDLC SERPL-MCNC: 48 MG/DL (ref 40–59)
HGB BLD-MCNC: 14.6 G/DL (ref 12–16)
IMM GRANULOCYTES # BLD AUTO: 0.01 X10(3) UL (ref 0–1)
IMM GRANULOCYTES NFR BLD: 0.2 %
LDLC SERPL CALC-MCNC: 133 MG/DL (ref ?–100)
LYMPHOCYTES # BLD AUTO: 1.91 X10(3) UL (ref 1–4)
LYMPHOCYTES NFR BLD AUTO: 34 %
MCH RBC QN AUTO: 29.1 PG (ref 26–34)
MCHC RBC AUTO-ENTMCNC: 34.4 G/DL (ref 31–37)
MCV RBC AUTO: 84.8 FL (ref 80–100)
MONOCYTES # BLD AUTO: 0.39 X10(3) UL (ref 0.1–1)
MONOCYTES NFR BLD AUTO: 7 %
NEUTROPHILS # BLD AUTO: 2.87 X10 (3) UL (ref 1.5–7.7)
NEUTROPHILS # BLD AUTO: 2.87 X10(3) UL (ref 1.5–7.7)
NEUTROPHILS NFR BLD AUTO: 51.1 %
NONHDLC SERPL-MCNC: 150 MG/DL (ref ?–130)
OSMOLALITY SERPL CALC.SUM OF ELEC: 291 MOSM/KG (ref 275–295)
PLATELET # BLD AUTO: 210 10(3)UL (ref 150–450)
POTASSIUM SERPL-SCNC: 4 MMOL/L (ref 3.5–5.1)
PROT SERPL-MCNC: 7.7 G/DL (ref 5.7–8.2)
RBC # BLD AUTO: 5.01 X10(6)UL (ref 3.8–5.3)
SODIUM SERPL-SCNC: 140 MMOL/L (ref 136–145)
T4 FREE SERPL-MCNC: 1.2 NG/DL (ref 0.8–1.7)
TRIGL SERPL-MCNC: 95 MG/DL (ref 30–149)
TSI SER-ACNC: 6.72 UIU/ML (ref 0.55–4.78)
VIT B12 SERPL-MCNC: 339 PG/ML (ref 211–911)
VLDLC SERPL CALC-MCNC: 17 MG/DL (ref 0–30)
WBC # BLD AUTO: 5.6 X10(3) UL (ref 4–11)

## 2025-08-11 PROCEDURE — 83036 HEMOGLOBIN GLYCOSYLATED A1C: CPT | Performed by: FAMILY MEDICINE

## 2025-08-11 PROCEDURE — 85025 COMPLETE CBC W/AUTO DIFF WBC: CPT | Performed by: FAMILY MEDICINE

## 2025-08-11 PROCEDURE — 82607 VITAMIN B-12: CPT | Performed by: FAMILY MEDICINE

## 2025-08-11 PROCEDURE — 80061 LIPID PANEL: CPT | Performed by: FAMILY MEDICINE

## 2025-08-11 PROCEDURE — 84443 ASSAY THYROID STIM HORMONE: CPT | Performed by: FAMILY MEDICINE

## 2025-08-11 PROCEDURE — 80053 COMPREHEN METABOLIC PANEL: CPT | Performed by: FAMILY MEDICINE

## 2025-08-11 PROCEDURE — 84439 ASSAY OF FREE THYROXINE: CPT | Performed by: FAMILY MEDICINE

## (undated) NOTE — LETTER
Date: 3/9/2024    Patient Name: Emily Pitts          To Whom it may concern:     The above patient was seen at [facility] for treatment of a medical condition.    This patient should be excused from attending work/school from 3/9/24.    The patient may return to work/school on her next scheduled day.      Thank you,    MATT Workman